# Patient Record
Sex: FEMALE | Race: WHITE | NOT HISPANIC OR LATINO
[De-identification: names, ages, dates, MRNs, and addresses within clinical notes are randomized per-mention and may not be internally consistent; named-entity substitution may affect disease eponyms.]

---

## 2017-11-03 PROBLEM — Z00.00 ENCOUNTER FOR PREVENTIVE HEALTH EXAMINATION: Noted: 2017-11-03

## 2017-12-05 ENCOUNTER — APPOINTMENT (OUTPATIENT)
Dept: ENDOCRINOLOGY | Facility: CLINIC | Age: 37
End: 2017-12-05
Payer: COMMERCIAL

## 2017-12-05 VITALS
HEART RATE: 91 BPM | OXYGEN SATURATION: 97 % | BODY MASS INDEX: 29.19 KG/M2 | HEIGHT: 67 IN | DIASTOLIC BLOOD PRESSURE: 70 MMHG | WEIGHT: 186 LBS | RESPIRATION RATE: 16 BRPM | SYSTOLIC BLOOD PRESSURE: 106 MMHG

## 2017-12-05 DIAGNOSIS — E34.9 ENDOCRINE DISORDER, UNSPECIFIED: ICD-10-CM

## 2017-12-05 DIAGNOSIS — Z80.3 FAMILY HISTORY OF MALIGNANT NEOPLASM OF BREAST: ICD-10-CM

## 2017-12-05 DIAGNOSIS — Z80.41 FAMILY HISTORY OF MALIGNANT NEOPLASM OF OVARY: ICD-10-CM

## 2017-12-05 DIAGNOSIS — R63.5 ABNORMAL WEIGHT GAIN: ICD-10-CM

## 2017-12-05 DIAGNOSIS — Z80.9 FAMILY HISTORY OF MALIGNANT NEOPLASM, UNSPECIFIED: ICD-10-CM

## 2017-12-05 DIAGNOSIS — N91.1 SECONDARY AMENORRHEA: ICD-10-CM

## 2017-12-05 DIAGNOSIS — Z87.42 PERSONAL HISTORY OF OTHER DISEASES OF THE FEMALE GENITAL TRACT: ICD-10-CM

## 2017-12-05 DIAGNOSIS — Z83.3 FAMILY HISTORY OF DIABETES MELLITUS: ICD-10-CM

## 2017-12-05 PROCEDURE — 99205 OFFICE O/P NEW HI 60 MIN: CPT

## 2017-12-05 RX ORDER — METFORMIN HYDROCHLORIDE 500 MG/1
500 TABLET, COATED ORAL
Qty: 30 | Refills: 5 | Status: ACTIVE | COMMUNITY
Start: 2017-12-05 | End: 1900-01-01

## 2017-12-12 LAB
17OHP SERPL-MCNC: 87 NG/DL
ANDROST SERPL-MCNC: 305 NG/DL
DHEA-SULFATE, SERUM: 153 UG/DL
IGF-1 INTERP: NORMAL
IGF-I BLD-MCNC: 199 NG/ML
PROLACTIN SERPL-MCNC: 11.3 NG/ML
TESTOST BND SERPL-MCNC: 4.7 PG/ML
TESTOST SERPL-MCNC: 65.7 NG/DL
TSH SERPL-ACNC: 1.08 UIU/ML

## 2018-07-19 ENCOUNTER — APPOINTMENT (OUTPATIENT)
Dept: SURGERY | Facility: CLINIC | Age: 38
End: 2018-07-19
Payer: COMMERCIAL

## 2018-07-19 VITALS
BODY MASS INDEX: 29.03 KG/M2 | OXYGEN SATURATION: 100 % | TEMPERATURE: 98.2 F | SYSTOLIC BLOOD PRESSURE: 104 MMHG | DIASTOLIC BLOOD PRESSURE: 63 MMHG | HEIGHT: 67 IN | WEIGHT: 185 LBS | HEART RATE: 68 BPM

## 2018-07-19 PROCEDURE — 99203 OFFICE O/P NEW LOW 30 MIN: CPT

## 2018-08-02 PROBLEM — Z00.00 ENCOUNTER FOR PREVENTIVE HEALTH EXAMINATION: Status: ACTIVE | Noted: 2018-08-02

## 2018-08-07 ENCOUNTER — OUTPATIENT (OUTPATIENT)
Dept: OUTPATIENT SERVICES | Facility: HOSPITAL | Age: 38
LOS: 1 days | End: 2018-08-07
Payer: COMMERCIAL

## 2018-08-07 VITALS
OXYGEN SATURATION: 99 % | HEART RATE: 76 BPM | HEIGHT: 66 IN | DIASTOLIC BLOOD PRESSURE: 72 MMHG | RESPIRATION RATE: 18 BRPM | WEIGHT: 182.98 LBS | TEMPERATURE: 98 F | SYSTOLIC BLOOD PRESSURE: 110 MMHG

## 2018-08-07 DIAGNOSIS — Z01.818 ENCOUNTER FOR OTHER PREPROCEDURAL EXAMINATION: ICD-10-CM

## 2018-08-07 DIAGNOSIS — K81.9 CHOLECYSTITIS, UNSPECIFIED: ICD-10-CM

## 2018-08-07 DIAGNOSIS — Z98.890 OTHER SPECIFIED POSTPROCEDURAL STATES: Chronic | ICD-10-CM

## 2018-08-07 LAB
BLD GP AB SCN SERPL QL: SIGNIFICANT CHANGE UP
HCG SERPL-ACNC: <1 MIU/ML — SIGNIFICANT CHANGE UP

## 2018-08-07 PROCEDURE — 84702 CHORIONIC GONADOTROPIN TEST: CPT

## 2018-08-07 PROCEDURE — 86850 RBC ANTIBODY SCREEN: CPT

## 2018-08-07 PROCEDURE — 86900 BLOOD TYPING SEROLOGIC ABO: CPT

## 2018-08-07 PROCEDURE — 86901 BLOOD TYPING SEROLOGIC RH(D): CPT

## 2018-08-07 PROCEDURE — G0463: CPT

## 2018-08-07 RX ORDER — SODIUM CHLORIDE 9 MG/ML
3 INJECTION INTRAMUSCULAR; INTRAVENOUS; SUBCUTANEOUS EVERY 8 HOURS
Qty: 0 | Refills: 0 | Status: DISCONTINUED | OUTPATIENT
Start: 2018-08-10 | End: 2018-08-18

## 2018-08-07 NOTE — H&P PST ADULT - HISTORY OF PRESENT ILLNESS
37 yr old female with PMH of PCOS presents with c/o intermittent right upper quadrant pain due to sludge in gallbladder. Pt for laparoscopic cholecystectomy with intraoperative cholangiogram, possible open on 8/10/2018.

## 2018-08-07 NOTE — H&P PST ADULT - NSANTHOSAYNRD_GEN_A_CORE
No. ZACH screening performed.  STOP BANG Legend: 0-2 = LOW Risk; 3-4 = INTERMEDIATE Risk; 5-8 = HIGH Risk

## 2018-08-07 NOTE — H&P PST ADULT - PMH
Cholelithiasis    PCOS (polycystic ovarian syndrome) Cholecystitis    Cholelithiasis    PCOS (polycystic ovarian syndrome)

## 2018-08-07 NOTE — H&P PST ADULT - RS GEN PE MLT RESP DETAILS PC
no intercostal retractions/no rales/normal/no rhonchi/airway patent/no wheezes/no chest wall tenderness/good air movement/breath sounds equal/no subcutaneous emphysema/respirations non-labored/clear to auscultation bilaterally

## 2018-08-07 NOTE — H&P PST ADULT - ASSESSMENT
37 yr old female with PMH of PCOS presents with cholecystitis. Pt for laparoscopic cholecystectomy with intraoperative cholangiogram, possible open on 8/10/2018.

## 2018-08-07 NOTE — H&P PST ADULT - NEGATIVE CARDIOVASCULAR SYMPTOMS
no paroxysmal nocturnal dyspnea/no peripheral edema/no palpitations/no orthopnea/no dyspnea on exertion/no claudication/no chest pain

## 2018-08-07 NOTE — H&P PST ADULT - FAMILY HISTORY
Father  Still living? Yes, Estimated age: Age Unknown  Family history of diabetes mellitus in father, Age at diagnosis: Age Unknown     Grandparent  Still living? No  Family history of breast cancer, Age at diagnosis: Age Unknown  Family history of skin cancer, Age at diagnosis: Age Unknown

## 2018-08-09 ENCOUNTER — TRANSCRIPTION ENCOUNTER (OUTPATIENT)
Age: 38
End: 2018-08-09

## 2018-08-10 ENCOUNTER — RESULT REVIEW (OUTPATIENT)
Age: 38
End: 2018-08-10

## 2018-08-10 ENCOUNTER — OUTPATIENT (OUTPATIENT)
Dept: OUTPATIENT SERVICES | Facility: HOSPITAL | Age: 38
LOS: 1 days | End: 2018-08-10
Payer: COMMERCIAL

## 2018-08-10 VITALS
HEART RATE: 75 BPM | DIASTOLIC BLOOD PRESSURE: 73 MMHG | RESPIRATION RATE: 16 BRPM | OXYGEN SATURATION: 100 % | SYSTOLIC BLOOD PRESSURE: 111 MMHG | HEIGHT: 66 IN | TEMPERATURE: 98 F | WEIGHT: 182.98 LBS

## 2018-08-10 VITALS
SYSTOLIC BLOOD PRESSURE: 124 MMHG | TEMPERATURE: 98 F | OXYGEN SATURATION: 98 % | DIASTOLIC BLOOD PRESSURE: 73 MMHG | RESPIRATION RATE: 14 BRPM | HEART RATE: 51 BPM

## 2018-08-10 DIAGNOSIS — K81.9 CHOLECYSTITIS, UNSPECIFIED: ICD-10-CM

## 2018-08-10 DIAGNOSIS — Z98.890 OTHER SPECIFIED POSTPROCEDURAL STATES: Chronic | ICD-10-CM

## 2018-08-10 LAB — BLD GP AB SCN SERPL QL: SIGNIFICANT CHANGE UP

## 2018-08-10 PROCEDURE — 47563 LAPARO CHOLECYSTECTOMY/GRAPH: CPT | Mod: AS

## 2018-08-10 PROCEDURE — 47563 LAPARO CHOLECYSTECTOMY/GRAPH: CPT

## 2018-08-10 PROCEDURE — 86850 RBC ANTIBODY SCREEN: CPT

## 2018-08-10 PROCEDURE — 88304 TISSUE EXAM BY PATHOLOGIST: CPT

## 2018-08-10 PROCEDURE — 86901 BLOOD TYPING SEROLOGIC RH(D): CPT

## 2018-08-10 PROCEDURE — 88304 TISSUE EXAM BY PATHOLOGIST: CPT | Mod: 26

## 2018-08-10 PROCEDURE — 76000 FLUOROSCOPY <1 HR PHYS/QHP: CPT

## 2018-08-10 RX ORDER — ACETAMINOPHEN WITH CODEINE 300MG-30MG
1 TABLET ORAL EVERY 4 HOURS
Qty: 0 | Refills: 0 | Status: DISCONTINUED | OUTPATIENT
Start: 2018-08-10 | End: 2018-08-10

## 2018-08-10 RX ORDER — SODIUM CHLORIDE 9 MG/ML
1000 INJECTION, SOLUTION INTRAVENOUS
Qty: 0 | Refills: 0 | Status: DISCONTINUED | OUTPATIENT
Start: 2018-08-10 | End: 2018-08-10

## 2018-08-10 RX ORDER — ACETAMINOPHEN WITH CODEINE 300MG-30MG
1 TABLET ORAL
Qty: 6 | Refills: 0 | OUTPATIENT
Start: 2018-08-10

## 2018-08-10 RX ORDER — ACETAMINOPHEN 500 MG
2 TABLET ORAL
Qty: 0 | Refills: 0 | COMMUNITY

## 2018-08-10 RX ORDER — HYDROMORPHONE HYDROCHLORIDE 2 MG/ML
0.5 INJECTION INTRAMUSCULAR; INTRAVENOUS; SUBCUTANEOUS
Qty: 0 | Refills: 0 | Status: DISCONTINUED | OUTPATIENT
Start: 2018-08-10 | End: 2018-08-10

## 2018-08-10 RX ORDER — ACETAMINOPHEN 500 MG
1000 TABLET ORAL ONCE
Qty: 0 | Refills: 0 | Status: COMPLETED | OUTPATIENT
Start: 2018-08-10 | End: 2018-08-10

## 2018-08-10 RX ORDER — ONDANSETRON 8 MG/1
4 TABLET, FILM COATED ORAL ONCE
Qty: 0 | Refills: 0 | Status: DISCONTINUED | OUTPATIENT
Start: 2018-08-10 | End: 2018-08-10

## 2018-08-10 RX ADMIN — Medication 400 MILLIGRAM(S): at 10:00

## 2018-08-10 RX ADMIN — HYDROMORPHONE HYDROCHLORIDE 0.5 MILLIGRAM(S): 2 INJECTION INTRAMUSCULAR; INTRAVENOUS; SUBCUTANEOUS at 10:15

## 2018-08-10 RX ADMIN — SODIUM CHLORIDE 3 MILLILITER(S): 9 INJECTION INTRAMUSCULAR; INTRAVENOUS; SUBCUTANEOUS at 07:40

## 2018-08-10 RX ADMIN — Medication 1000 MILLIGRAM(S): at 10:15

## 2018-08-16 LAB — SURGICAL PATHOLOGY FINAL REPORT - CH: SIGNIFICANT CHANGE UP

## 2018-08-20 PROBLEM — K80.20 CALCULUS OF GALLBLADDER WITHOUT CHOLECYSTITIS WITHOUT OBSTRUCTION: Chronic | Status: ACTIVE | Noted: 2018-08-07

## 2018-08-20 PROBLEM — E28.2 POLYCYSTIC OVARIAN SYNDROME: Chronic | Status: ACTIVE | Noted: 2018-08-07

## 2018-08-20 PROBLEM — K81.9 CHOLECYSTITIS, UNSPECIFIED: Chronic | Status: ACTIVE | Noted: 2018-08-07

## 2018-08-27 ENCOUNTER — APPOINTMENT (OUTPATIENT)
Dept: SURGERY | Facility: CLINIC | Age: 38
End: 2018-08-27
Payer: COMMERCIAL

## 2018-08-27 PROCEDURE — 99024 POSTOP FOLLOW-UP VISIT: CPT

## 2019-04-01 PROBLEM — N91.1 SECONDARY AMENORRHEA: Status: ACTIVE | Noted: 2017-12-05

## 2020-07-29 ENCOUNTER — RESULT REVIEW (OUTPATIENT)
Age: 40
End: 2020-07-29

## 2023-06-21 ENCOUNTER — EMERGENCY (EMERGENCY)
Facility: HOSPITAL | Age: 43
LOS: 1 days | Discharge: HOME | End: 2023-06-21
Attending: EMERGENCY MEDICINE | Admitting: EMERGENCY MEDICINE
Payer: COMMERCIAL

## 2023-06-21 VITALS
RESPIRATION RATE: 15 BRPM | SYSTOLIC BLOOD PRESSURE: 118 MMHG | DIASTOLIC BLOOD PRESSURE: 68 MMHG | TEMPERATURE: 98 F | OXYGEN SATURATION: 100 % | HEART RATE: 77 BPM

## 2023-06-21 VITALS
HEART RATE: 79 BPM | RESPIRATION RATE: 18 BRPM | SYSTOLIC BLOOD PRESSURE: 140 MMHG | DIASTOLIC BLOOD PRESSURE: 73 MMHG | TEMPERATURE: 98 F | OXYGEN SATURATION: 98 %

## 2023-06-21 DIAGNOSIS — Z98.890 OTHER SPECIFIED POSTPROCEDURAL STATES: Chronic | ICD-10-CM

## 2023-06-21 LAB
ALBUMIN SERPL ELPH-MCNC: 4.7 G/DL — SIGNIFICANT CHANGE UP (ref 3.3–5)
ALP SERPL-CCNC: 76 U/L — SIGNIFICANT CHANGE UP (ref 40–120)
ALT FLD-CCNC: 32 U/L — SIGNIFICANT CHANGE UP (ref 4–33)
ANION GAP SERPL CALC-SCNC: 13 MMOL/L — SIGNIFICANT CHANGE UP (ref 7–14)
APPEARANCE UR: ABNORMAL
AST SERPL-CCNC: 21 U/L — SIGNIFICANT CHANGE UP (ref 4–32)
B PERT DNA SPEC QL NAA+PROBE: SIGNIFICANT CHANGE UP
B PERT+PARAPERT DNA PNL SPEC NAA+PROBE: SIGNIFICANT CHANGE UP
BACTERIA # UR AUTO: NEGATIVE — SIGNIFICANT CHANGE UP
BASOPHILS # BLD AUTO: 0.04 K/UL — SIGNIFICANT CHANGE UP (ref 0–0.2)
BASOPHILS NFR BLD AUTO: 0.4 % — SIGNIFICANT CHANGE UP (ref 0–2)
BILIRUB SERPL-MCNC: 1.2 MG/DL — SIGNIFICANT CHANGE UP (ref 0.2–1.2)
BILIRUB UR-MCNC: NEGATIVE — SIGNIFICANT CHANGE UP
BORDETELLA PARAPERTUSSIS (RAPRVP): SIGNIFICANT CHANGE UP
BUN SERPL-MCNC: 8 MG/DL — SIGNIFICANT CHANGE UP (ref 7–23)
C PNEUM DNA SPEC QL NAA+PROBE: SIGNIFICANT CHANGE UP
CALCIUM SERPL-MCNC: 9.7 MG/DL — SIGNIFICANT CHANGE UP (ref 8.4–10.5)
CHLORIDE SERPL-SCNC: 104 MMOL/L — SIGNIFICANT CHANGE UP (ref 98–107)
CO2 SERPL-SCNC: 21 MMOL/L — LOW (ref 22–31)
COLOR SPEC: YELLOW — SIGNIFICANT CHANGE UP
CREAT SERPL-MCNC: 0.62 MG/DL — SIGNIFICANT CHANGE UP (ref 0.5–1.3)
DIFF PNL FLD: ABNORMAL
EGFR: 114 ML/MIN/1.73M2 — SIGNIFICANT CHANGE UP
EOSINOPHIL # BLD AUTO: 0.02 K/UL — SIGNIFICANT CHANGE UP (ref 0–0.5)
EOSINOPHIL NFR BLD AUTO: 0.2 % — SIGNIFICANT CHANGE UP (ref 0–6)
EPI CELLS # UR: 6 /HPF — HIGH (ref 0–5)
FLUAV SUBTYP SPEC NAA+PROBE: SIGNIFICANT CHANGE UP
FLUBV RNA SPEC QL NAA+PROBE: SIGNIFICANT CHANGE UP
GLUCOSE SERPL-MCNC: 118 MG/DL — HIGH (ref 70–99)
GLUCOSE UR QL: NEGATIVE — SIGNIFICANT CHANGE UP
HADV DNA SPEC QL NAA+PROBE: SIGNIFICANT CHANGE UP
HCG SERPL-ACNC: <1 MIU/ML — SIGNIFICANT CHANGE UP
HCOV 229E RNA SPEC QL NAA+PROBE: SIGNIFICANT CHANGE UP
HCOV HKU1 RNA SPEC QL NAA+PROBE: SIGNIFICANT CHANGE UP
HCOV NL63 RNA SPEC QL NAA+PROBE: SIGNIFICANT CHANGE UP
HCOV OC43 RNA SPEC QL NAA+PROBE: SIGNIFICANT CHANGE UP
HCT VFR BLD CALC: 41.1 % — SIGNIFICANT CHANGE UP (ref 34.5–45)
HGB BLD-MCNC: 13.9 G/DL — SIGNIFICANT CHANGE UP (ref 11.5–15.5)
HMPV RNA SPEC QL NAA+PROBE: SIGNIFICANT CHANGE UP
HPIV1 RNA SPEC QL NAA+PROBE: SIGNIFICANT CHANGE UP
HPIV2 RNA SPEC QL NAA+PROBE: SIGNIFICANT CHANGE UP
HPIV3 RNA SPEC QL NAA+PROBE: SIGNIFICANT CHANGE UP
HPIV4 RNA SPEC QL NAA+PROBE: SIGNIFICANT CHANGE UP
HYALINE CASTS # UR AUTO: 0 /LPF — SIGNIFICANT CHANGE UP (ref 0–7)
IANC: 5.67 K/UL — SIGNIFICANT CHANGE UP (ref 1.8–7.4)
IMM GRANULOCYTES NFR BLD AUTO: 0.3 % — SIGNIFICANT CHANGE UP (ref 0–0.9)
KETONES UR-MCNC: ABNORMAL
LEUKOCYTE ESTERASE UR-ACNC: NEGATIVE — SIGNIFICANT CHANGE UP
LIDOCAIN IGE QN: 33 U/L — SIGNIFICANT CHANGE UP (ref 7–60)
LYMPHOCYTES # BLD AUTO: 2.74 K/UL — SIGNIFICANT CHANGE UP (ref 1–3.3)
LYMPHOCYTES # BLD AUTO: 30.2 % — SIGNIFICANT CHANGE UP (ref 13–44)
M PNEUMO DNA SPEC QL NAA+PROBE: SIGNIFICANT CHANGE UP
MAGNESIUM SERPL-MCNC: 2 MG/DL — SIGNIFICANT CHANGE UP (ref 1.6–2.6)
MCHC RBC-ENTMCNC: 29.4 PG — SIGNIFICANT CHANGE UP (ref 27–34)
MCHC RBC-ENTMCNC: 33.8 GM/DL — SIGNIFICANT CHANGE UP (ref 32–36)
MCV RBC AUTO: 87.1 FL — SIGNIFICANT CHANGE UP (ref 80–100)
MONOCYTES # BLD AUTO: 0.56 K/UL — SIGNIFICANT CHANGE UP (ref 0–0.9)
MONOCYTES NFR BLD AUTO: 6.2 % — SIGNIFICANT CHANGE UP (ref 2–14)
NEUTROPHILS # BLD AUTO: 5.67 K/UL — SIGNIFICANT CHANGE UP (ref 1.8–7.4)
NEUTROPHILS NFR BLD AUTO: 62.7 % — SIGNIFICANT CHANGE UP (ref 43–77)
NITRITE UR-MCNC: NEGATIVE — SIGNIFICANT CHANGE UP
NRBC # BLD: 0 /100 WBCS — SIGNIFICANT CHANGE UP (ref 0–0)
NRBC # FLD: 0 K/UL — SIGNIFICANT CHANGE UP (ref 0–0)
PH UR: 6 — SIGNIFICANT CHANGE UP (ref 5–8)
PHOSPHATE SERPL-MCNC: 3.2 MG/DL — SIGNIFICANT CHANGE UP (ref 2.5–4.5)
PLATELET # BLD AUTO: 235 K/UL — SIGNIFICANT CHANGE UP (ref 150–400)
POTASSIUM SERPL-MCNC: 4 MMOL/L — SIGNIFICANT CHANGE UP (ref 3.5–5.3)
POTASSIUM SERPL-SCNC: 4 MMOL/L — SIGNIFICANT CHANGE UP (ref 3.5–5.3)
PROT SERPL-MCNC: 7.6 G/DL — SIGNIFICANT CHANGE UP (ref 6–8.3)
PROT UR-MCNC: ABNORMAL
RAPID RVP RESULT: SIGNIFICANT CHANGE UP
RBC # BLD: 4.72 M/UL — SIGNIFICANT CHANGE UP (ref 3.8–5.2)
RBC # FLD: 12.7 % — SIGNIFICANT CHANGE UP (ref 10.3–14.5)
RBC CASTS # UR COMP ASSIST: 4 /HPF — SIGNIFICANT CHANGE UP (ref 0–4)
RSV RNA SPEC QL NAA+PROBE: SIGNIFICANT CHANGE UP
RV+EV RNA SPEC QL NAA+PROBE: SIGNIFICANT CHANGE UP
SARS-COV-2 RNA SPEC QL NAA+PROBE: SIGNIFICANT CHANGE UP
SODIUM SERPL-SCNC: 138 MMOL/L — SIGNIFICANT CHANGE UP (ref 135–145)
SP GR SPEC: 1.03 — SIGNIFICANT CHANGE UP (ref 1.01–1.05)
TSH SERPL-MCNC: 2.8 UIU/ML — SIGNIFICANT CHANGE UP (ref 0.27–4.2)
UROBILINOGEN FLD QL: SIGNIFICANT CHANGE UP
WBC # BLD: 9.06 K/UL — SIGNIFICANT CHANGE UP (ref 3.8–10.5)
WBC # FLD AUTO: 9.06 K/UL — SIGNIFICANT CHANGE UP (ref 3.8–10.5)
WBC UR QL: 2 /HPF — SIGNIFICANT CHANGE UP (ref 0–5)

## 2023-06-21 PROCEDURE — 71045 X-RAY EXAM CHEST 1 VIEW: CPT | Mod: 26

## 2023-06-21 PROCEDURE — 99285 EMERGENCY DEPT VISIT HI MDM: CPT

## 2023-06-21 PROCEDURE — G1004: CPT

## 2023-06-21 PROCEDURE — 70450 CT HEAD/BRAIN W/O DYE: CPT | Mod: 26,MG

## 2023-06-21 PROCEDURE — 93010 ELECTROCARDIOGRAM REPORT: CPT

## 2023-06-21 RX ORDER — SODIUM CHLORIDE 9 MG/ML
1000 INJECTION INTRAMUSCULAR; INTRAVENOUS; SUBCUTANEOUS ONCE
Refills: 0 | Status: COMPLETED | OUTPATIENT
Start: 2023-06-21 | End: 2023-06-21

## 2023-06-21 RX ADMIN — SODIUM CHLORIDE 1000 MILLILITER(S): 9 INJECTION INTRAMUSCULAR; INTRAVENOUS; SUBCUTANEOUS at 07:20

## 2023-06-21 NOTE — ED ADULT NURSE NOTE - NSFALLHARMRISKINTERV_ED_ALL_ED

## 2023-06-21 NOTE — ED PROVIDER NOTE - NSFOLLOWUPINSTRUCTIONS_ED_ALL_ED_FT
[facility]  Cardiology  [address] [place] [address]  Phone: [phone]  Fax:   Follow Up Time:     [facility]  Cardiology  [address], [address]  [place] [address]  Phone: [phone]  Fax:   Follow Up Time:     [facility]  Cardiology  [address] [place] [address]  Phone: [phone]  Fax:   Follow Up Time:     Near Syncope    WHAT YOU NEED TO KNOW:    Near syncope, also called presyncope, is the feeling that you may faint (lose consciousness), but you do not. You can control some health conditions that cause near syncope. Your healthcare providers can help you create a plan to manage near syncope and prevent episodes.    DISCHARGE INSTRUCTIONS:    Seek care immediately if:    You have sudden chest pain.    You have trouble breathing or shortness of breath.    You have vision changes, are sweating, and have nausea while you are sitting or lying down.    You feel dizzy or flushed and your heart is fluttering.    You lose consciousness.    You cannot use your arm, hand, foot, or leg, or it feels weak.    You have trouble speaking or understanding others when they speak.  Contact your healthcare provider if:    You have new or worsening symptoms.    Your heart beats faster or slower than usual.    You have questions or concerns about your condition or care.  Medicines:    Medicines may be needed to help your heart pump strongly and regularly. Your healthcare provider may also make changes to any medicines that are causing syncope.    Take your medicine as directed. Contact your healthcare provider if you think your medicine is not helping or if you have side effects. Tell him or her if you are allergic to any medicine. Keep a list of the medicines, vitamins, and herbs you take. Include the amounts, and when and why you take them. Bring the list or the pill bottles to follow-up visits. Carry your medicine list with you in case of an emergency.  Follow up with your healthcare provider as directed: You may need more tests to help find the cause of your near syncope episodes. The tests will help healthcare providers plan the best treatment for you. Write down your questions so you remember to ask them during your visits.    Manage near syncope:    Sit or lie down when needed. This includes when you feel dizzy, your throat is getting tight, and your vision changes. Raise your legs above the level of your heart.    Take slow, deep breaths if you start to breathe faster with anxiety or fear. This can help decrease dizziness and the feeling that you might faint.    Keep a record of your near syncope episodes. Include your symptoms and your activity before and after the episode. The record can help your healthcare provider find the cause of your near syncope and help you manage episodes.  Prevent a near syncope episode:    Move slowly and let yourself get used to one position before you move to another position. This is very important when you change from a lying or sitting position to a standing position. Take some deep breaths before you stand up from a lying position. Stand up slowly. Sudden movements may cause a fainting spell. Sit on the side of the bed or couch for a few minutes before you stand up. If you are on bedrest, try to be upright for about 2 hours each day, or as directed. Do not lock your legs if you are standing for a long period of time. Move your legs and bend your knees to keep blood flowing.    Follow your healthcare provider's recommendations. Your provider may recommend that you drink more liquids to prevent dehydration. You may also need to have more salt to keep your blood pressure from dropping too low and causing syncope. Your provider will tell you how much liquid and sodium to have each day. He or she will also tell you how much physical activity is safe for you. This will depend on what is causing your near syncope.    Watch for signs of low blood sugar. These include hunger, nervousness, sweating, and fast or fluttery heartbeats. Talk with your healthcare provider about ways to keep your blood sugar level steady.    Check your blood pressure often. This is important if you take medicine to lower your blood pressure. Check your blood pressure when you are lying down and when you are standing. Ask how often to check during the day. Keep a record of your blood pressure numbers. Your healthcare provider may use the record to help plan your treatment.  How to take a Blood Pressure      Do not strain if you are constipated. You may faint if you strain to have a bowel movement. Walking is the best way to get your bowels moving. Eat foods high in fiber to make it easier to have a bowel movement. Good examples are high-fiber cereals, beans, vegetables, and whole-grain breads. Prune juice may help make bowel movements softer.

## 2023-06-21 NOTE — ED PROVIDER NOTE - OBJECTIVE STATEMENT
42-year-old female history of PCOS, no other medical history presenting with chief complaint of near syncope.  Patient states that she was sitting on her couch watching TV at 8 PM last night, when she felt  like she was going to pass out, endorsing darkening of vision and so she presented to near [facility].  Patient had negative work-up including EKG as well as labs and chest x-ray.  She was discharged overnight, however patient felt recurrence of presyncope so presented here to the ED.  She is denying any chest pain, shortness of breath, dark stools, nausea, vomiting, fevers or chills.  Patient is denying any family history of cardiac death at an early age.  States that she used to take ozempic for weight loss but denies use currently. Normal .  denies any  numbness, tingling, leg or arm weakness.    NKDA

## 2023-06-21 NOTE — ED PROVIDER NOTE - PHYSICAL EXAMINATION
Gen: non-toxic appearing  Head: NCAT  Eyes: EOMI, PERRLA, no conjunctival pallor, no scleral icterus  ENT: mucous membranes moist, no discharge  Neck: neck supple  Resp: CTAB, no W/R/R  CV: RRR, +S1/S2, no M/R/G  GI: Abdomen soft non-distended, NTTP, no masses  MSK: No open wounds, no bruising, no lower extremity edema  Neuro: A&Ox4, sensation nl, motor 5/5 RUE/LUE/RLE/LLE, follows commands  Ext: no edema, no deformity, warm and well-perfused. minor shaking  Skin: skin warm and moist, some pallor

## 2023-06-21 NOTE — ED ADULT TRIAGE NOTE - CHIEF COMPLAINT QUOTE
DC earlier from [facility] for same complaint (negative work up)- p/w generalized weakness, pallor, and pre-syncope- no pmhx,

## 2023-06-21 NOTE — ED PROVIDER NOTE - CLINICAL SUMMARY MEDICAL DECISION MAKING FREE TEXT BOX
[facility] BLACK PGY-3: 42-year-old female history of PCOS, no other medical history presenting with chief complaint of near syncope.   Patient with recent negative work-up at outside hospital.   Will obtain  repeat cardiac work-up to evaluate for ACS, chest x-ray.  Patient also endorsing some increased urination, will obtain urinalysis to evaluate for possible UTI.  Fingerstick here, and afebrile in the ED including rectal temp.  Will obtain RVP to evaluate for possible  flu given minor shaking. We will obtain labs to evaluate for electrolyte abnormality/metabolic imbalance.  Will place patient on cardiac monitor to closely reassess.  Will obtain CBC to evaluate for possible anemia given allergy and observed skin pallor.  No  severe tearing chest pain, pulse deficits or numbness/tingling to suggest more concerning diagnoses such as aortic dissection.  disposition pending results and patient course, can consider CDU for echo/stress test if patient with persistent symptoms.

## 2023-06-21 NOTE — ED PROVIDER NOTE - NSICDXFAMILYHX_GEN_ALL_CORE_FT
FAMILY HISTORY:  Father  Still living? Yes, Estimated age: Age Unknown  Family history of diabetes mellitus in father, Age at diagnosis: Age Unknown    Grandparent  Still living? No  Family history of breast cancer, Age at diagnosis: Age Unknown  Family history of skin cancer, Age at diagnosis: Age Unknown

## 2023-06-21 NOTE — ED PROVIDER NOTE - PATIENT PORTAL LINK FT
You can access the FollowMyHealth Patient Portal offered by [facility] by registering at the following website: [email or web address]. By joining [employer]’s FollowMyHealth portal, you will also be able to view your health information using other applications (apps) compatible with our system. You can access the FollowMyHealth Patient Portal offered by [facility] by registering at the following website: [email or web address]. By joining [employer]’s FollowMyHealth portal, you will also be able to view your health information using other applications (apps) compatible with our system.

## 2023-06-21 NOTE — ED ADULT NURSE NOTE - OBJECTIVE STATEMENT
Pt arrives to room 2 A&Ox4, ambulatory, on room air coming to ED for dizziness. Pt with no pertinent past medical history. Pt endorses at 8PM last night, while sitting on the couch, she encountered an episode of dizziness which prompted her to call EMS. Pt seen at [facility] with negative findings and discharged home. Pt arrives to [facility] for further check up. Pt appears pallor, endorses episodes of dizziness are still occurring. Denies recent falls, drug/alcohol use, chest pain, SOB, vision changes, dark stool. Respirations even and unlabored, chest rise and fall equal b/l. NSR on cardiac monitor. Pt endorses recent use of Ozempic for weight loss in March, denies current use. Fsx 109. Abdomen soft and nontender. Pt endorses urinary frequency, denies hematuria, dysuria. MD Byrd at bedside for assessment. Safety maintained.

## 2023-06-21 NOTE — ED PROVIDER NOTE - PROGRESS NOTE DETAILS
PT placed on NC at 2LPM due to 89% O2 sat while sleeping     Cooley Demarcus, RN  11/11/22 3196 Kirill Hernandez, PGY4: All labs reviewed and are nonactionable.  Chest x-ray clear without any infiltrates.  Patient feeling slightly better while in the ER.  Ambulatory with steady gait.  No clinical indications for admission at this time.  Discussed return precautions and all questions answered. Pt in agreement w/ plan. CAOx3, NAD, VSS. Stable for d/c. Kirill Hernandez, PGY4: Patient hesitant to leave, states "I still don't feel well". Spoke w/ patient and her  multiple times regarding blood work results. Patient requesting CT head. Advised of low suspicion for intracranial findings, lack of medical necessity, and the radiation exposure- patient still requesting imaging and reversal of discharge. Will obtain CT at patient's request and reassess. Kirill Hernandez, PGY4: CT without acute findings. Discussed results w/ patient. She is now agreeable to discharge. Will print new discharge with results.  to  patient.

## 2023-06-21 NOTE — ED ADULT NURSE NOTE - CHIEF COMPLAINT QUOTE
DC earlier from [facility] for same complaint (negative work up)- p/w generalized weakness, pallor, and pre-syncope- no pmhx,

## 2023-06-22 ENCOUNTER — EMERGENCY (EMERGENCY)
Facility: HOSPITAL | Age: 43
LOS: 1 days | Discharge: HOME | End: 2023-06-22
Attending: STUDENT IN AN ORGANIZED HEALTH CARE EDUCATION/TRAINING PROGRAM | Admitting: STUDENT IN AN ORGANIZED HEALTH CARE EDUCATION/TRAINING PROGRAM
Payer: COMMERCIAL

## 2023-06-22 VITALS
DIASTOLIC BLOOD PRESSURE: 77 MMHG | RESPIRATION RATE: 17 BRPM | TEMPERATURE: 98 F | HEART RATE: 68 BPM | SYSTOLIC BLOOD PRESSURE: 123 MMHG | OXYGEN SATURATION: 100 %

## 2023-06-22 DIAGNOSIS — E23.0 HYPOPITUITARISM: ICD-10-CM

## 2023-06-22 DIAGNOSIS — Z98.890 OTHER SPECIFIED POSTPROCEDURAL STATES: Chronic | ICD-10-CM

## 2023-06-22 DIAGNOSIS — Z87.42 PERSONAL HISTORY OF OTHER DISEASES OF THE FEMALE GENITAL TRACT: ICD-10-CM

## 2023-06-22 DIAGNOSIS — R42 DIZZINESS AND GIDDINESS: ICD-10-CM

## 2023-06-22 LAB
ALBUMIN SERPL ELPH-MCNC: 4.5 G/DL — SIGNIFICANT CHANGE UP (ref 3.3–5)
ALP SERPL-CCNC: 67 U/L — SIGNIFICANT CHANGE UP (ref 40–120)
ALT FLD-CCNC: 31 U/L — SIGNIFICANT CHANGE UP (ref 4–33)
ANION GAP SERPL CALC-SCNC: 14 MMOL/L — SIGNIFICANT CHANGE UP (ref 7–14)
APPEARANCE UR: CLEAR — SIGNIFICANT CHANGE UP
AST SERPL-CCNC: 35 U/L — HIGH (ref 4–32)
B PERT DNA SPEC QL NAA+PROBE: SIGNIFICANT CHANGE UP
B PERT+PARAPERT DNA PNL SPEC NAA+PROBE: SIGNIFICANT CHANGE UP
BASOPHILS # BLD AUTO: 0.03 K/UL — SIGNIFICANT CHANGE UP (ref 0–0.2)
BASOPHILS NFR BLD AUTO: 0.5 % — SIGNIFICANT CHANGE UP (ref 0–2)
BILIRUB SERPL-MCNC: 1.4 MG/DL — HIGH (ref 0.2–1.2)
BILIRUB UR-MCNC: NEGATIVE — SIGNIFICANT CHANGE UP
BLOOD GAS VENOUS COMPREHENSIVE RESULT: SIGNIFICANT CHANGE UP
BORDETELLA PARAPERTUSSIS (RAPRVP): SIGNIFICANT CHANGE UP
BUN SERPL-MCNC: 7 MG/DL — SIGNIFICANT CHANGE UP (ref 7–23)
C PNEUM DNA SPEC QL NAA+PROBE: SIGNIFICANT CHANGE UP
CALCIUM SERPL-MCNC: 9.3 MG/DL — SIGNIFICANT CHANGE UP (ref 8.4–10.5)
CHLORIDE SERPL-SCNC: 103 MMOL/L — SIGNIFICANT CHANGE UP (ref 98–107)
CO2 SERPL-SCNC: 20 MMOL/L — LOW (ref 22–31)
COLOR SPEC: SIGNIFICANT CHANGE UP
CREAT SERPL-MCNC: 0.63 MG/DL — SIGNIFICANT CHANGE UP (ref 0.5–1.3)
DIFF PNL FLD: NEGATIVE — SIGNIFICANT CHANGE UP
EGFR: 114 ML/MIN/1.73M2 — SIGNIFICANT CHANGE UP
EOSINOPHIL # BLD AUTO: 0.08 K/UL — SIGNIFICANT CHANGE UP (ref 0–0.5)
EOSINOPHIL NFR BLD AUTO: 1.3 % — SIGNIFICANT CHANGE UP (ref 0–6)
FLUAV SUBTYP SPEC NAA+PROBE: SIGNIFICANT CHANGE UP
FLUBV RNA SPEC QL NAA+PROBE: SIGNIFICANT CHANGE UP
GLUCOSE SERPL-MCNC: 98 MG/DL — SIGNIFICANT CHANGE UP (ref 70–99)
GLUCOSE UR QL: NEGATIVE — SIGNIFICANT CHANGE UP
HADV DNA SPEC QL NAA+PROBE: SIGNIFICANT CHANGE UP
HCG SERPL-ACNC: <1 MIU/ML — SIGNIFICANT CHANGE UP
HCOV 229E RNA SPEC QL NAA+PROBE: SIGNIFICANT CHANGE UP
HCOV HKU1 RNA SPEC QL NAA+PROBE: SIGNIFICANT CHANGE UP
HCOV NL63 RNA SPEC QL NAA+PROBE: SIGNIFICANT CHANGE UP
HCOV OC43 RNA SPEC QL NAA+PROBE: SIGNIFICANT CHANGE UP
HCT VFR BLD CALC: 46.2 % — HIGH (ref 34.5–45)
HGB BLD-MCNC: 14.8 G/DL — SIGNIFICANT CHANGE UP (ref 11.5–15.5)
HMPV RNA SPEC QL NAA+PROBE: SIGNIFICANT CHANGE UP
HPIV1 RNA SPEC QL NAA+PROBE: SIGNIFICANT CHANGE UP
HPIV2 RNA SPEC QL NAA+PROBE: SIGNIFICANT CHANGE UP
HPIV3 RNA SPEC QL NAA+PROBE: SIGNIFICANT CHANGE UP
HPIV4 RNA SPEC QL NAA+PROBE: SIGNIFICANT CHANGE UP
IANC: 3.63 K/UL — SIGNIFICANT CHANGE UP (ref 1.8–7.4)
IMM GRANULOCYTES NFR BLD AUTO: 0.2 % — SIGNIFICANT CHANGE UP (ref 0–0.9)
KETONES UR-MCNC: NEGATIVE — SIGNIFICANT CHANGE UP
LEUKOCYTE ESTERASE UR-ACNC: NEGATIVE — SIGNIFICANT CHANGE UP
LYMPHOCYTES # BLD AUTO: 2.22 K/UL — SIGNIFICANT CHANGE UP (ref 1–3.3)
LYMPHOCYTES # BLD AUTO: 35.5 % — SIGNIFICANT CHANGE UP (ref 13–44)
M PNEUMO DNA SPEC QL NAA+PROBE: SIGNIFICANT CHANGE UP
MAGNESIUM SERPL-MCNC: 2.1 MG/DL — SIGNIFICANT CHANGE UP (ref 1.6–2.6)
MCHC RBC-ENTMCNC: 29.2 PG — SIGNIFICANT CHANGE UP (ref 27–34)
MCHC RBC-ENTMCNC: 32 GM/DL — SIGNIFICANT CHANGE UP (ref 32–36)
MCV RBC AUTO: 91.3 FL — SIGNIFICANT CHANGE UP (ref 80–100)
MONOCYTES # BLD AUTO: 0.29 K/UL — SIGNIFICANT CHANGE UP (ref 0–0.9)
MONOCYTES NFR BLD AUTO: 4.6 % — SIGNIFICANT CHANGE UP (ref 2–14)
NEUTROPHILS # BLD AUTO: 3.63 K/UL — SIGNIFICANT CHANGE UP (ref 1.8–7.4)
NEUTROPHILS NFR BLD AUTO: 57.9 % — SIGNIFICANT CHANGE UP (ref 43–77)
NITRITE UR-MCNC: NEGATIVE — SIGNIFICANT CHANGE UP
NRBC # BLD: 0 /100 WBCS — SIGNIFICANT CHANGE UP (ref 0–0)
NRBC # FLD: 0 K/UL — SIGNIFICANT CHANGE UP (ref 0–0)
PH UR: 7 — SIGNIFICANT CHANGE UP (ref 5–8)
PHOSPHATE SERPL-MCNC: 2.6 MG/DL — SIGNIFICANT CHANGE UP (ref 2.5–4.5)
PLATELET # BLD AUTO: 218 K/UL — SIGNIFICANT CHANGE UP (ref 150–400)
POTASSIUM SERPL-MCNC: 5.2 MMOL/L — SIGNIFICANT CHANGE UP (ref 3.5–5.3)
POTASSIUM SERPL-SCNC: 5.2 MMOL/L — SIGNIFICANT CHANGE UP (ref 3.5–5.3)
PROT SERPL-MCNC: 7.2 G/DL — SIGNIFICANT CHANGE UP (ref 6–8.3)
PROT UR-MCNC: NEGATIVE — SIGNIFICANT CHANGE UP
RAPID RVP RESULT: SIGNIFICANT CHANGE UP
RBC # BLD: 5.06 M/UL — SIGNIFICANT CHANGE UP (ref 3.8–5.2)
RBC # FLD: 13 % — SIGNIFICANT CHANGE UP (ref 10.3–14.5)
RSV RNA SPEC QL NAA+PROBE: SIGNIFICANT CHANGE UP
RV+EV RNA SPEC QL NAA+PROBE: SIGNIFICANT CHANGE UP
SARS-COV-2 RNA SPEC QL NAA+PROBE: SIGNIFICANT CHANGE UP
SODIUM SERPL-SCNC: 137 MMOL/L — SIGNIFICANT CHANGE UP (ref 135–145)
SP GR SPEC: 1.01 — LOW (ref 1.01–1.05)
T3FREE SERPL-MCNC: 3 PG/ML — SIGNIFICANT CHANGE UP (ref 2–4.4)
T4 AB SER-ACNC: 8.08 UG/DL — SIGNIFICANT CHANGE UP (ref 5.1–13)
TSH SERPL-MCNC: 0.76 UIU/ML — SIGNIFICANT CHANGE UP (ref 0.27–4.2)
UROBILINOGEN FLD QL: SIGNIFICANT CHANGE UP
VIT B12 SERPL-MCNC: 663 PG/ML — SIGNIFICANT CHANGE UP (ref 200–900)
WBC # BLD: 6.26 K/UL — SIGNIFICANT CHANGE UP (ref 3.8–10.5)
WBC # FLD AUTO: 6.26 K/UL — SIGNIFICANT CHANGE UP (ref 3.8–10.5)

## 2023-06-22 PROCEDURE — 99284 EMERGENCY DEPT VISIT MOD MDM: CPT

## 2023-06-22 PROCEDURE — G1004: CPT

## 2023-06-22 PROCEDURE — 70553 MRI BRAIN STEM W/O & W/DYE: CPT | Mod: 26,MG,76

## 2023-06-22 PROCEDURE — 99223 1ST HOSP IP/OBS HIGH 75: CPT

## 2023-06-22 PROCEDURE — 93010 ELECTROCARDIOGRAM REPORT: CPT

## 2023-06-22 PROCEDURE — 71046 X-RAY EXAM CHEST 2 VIEWS: CPT | Mod: 26

## 2023-06-22 RX ORDER — SODIUM CHLORIDE 9 MG/ML
1000 INJECTION INTRAMUSCULAR; INTRAVENOUS; SUBCUTANEOUS ONCE
Refills: 0 | Status: COMPLETED | OUTPATIENT
Start: 2023-06-22 | End: 2023-06-22

## 2023-06-22 RX ADMIN — SODIUM CHLORIDE 1000 MILLILITER(S): 9 INJECTION INTRAMUSCULAR; INTRAVENOUS; SUBCUTANEOUS at 11:07

## 2023-06-22 NOTE — ED PROVIDER NOTE - CLINICAL SUMMARY MEDICAL DECISION MAKING FREE TEXT BOX
Patient with constellation of symptoms including near syncope, blurry vision, generalized weakness, numbness, mild headache, focal sensory changes over the face as well as several months of loose stools and hair loss.  Patient without focal neurodeficit but does appear fatigued.  Was in ED yesterday lab work at that time without significant abnormality CT brain without significant abnormality.  Patient with persistence of symptoms returning to ED today denies any vision changes at this time.  Doubt CVA as etiology of symptoms.  Concern for endocrinopathy.  TSH normal yesterday, will repeat additional TFTs as well as other labs.  Primary care provider concerned about patient as well as ability to expedite outpatient work-up.  Plan labs, Endo consult, will place in CDU for MRI.  Plan discussed with patient and  at bedside.

## 2023-06-22 NOTE — ED ADULT TRIAGE NOTE - CHIEF COMPLAINT QUOTE
Pt c/o of diaphoresis, dizziness and low sugar. States her symptoms started on Tuesday and was seen here yesterday. C/o of dizziness. No neuro deficits noted on assessment. Finger stick 98. Denies chest pain, headache and SOB.

## 2023-06-22 NOTE — ED PROVIDER NOTE - PHYSICAL EXAMINATION
GEN: no acute respiratory distress. nontoxic, speaking comfortably in full sentences, ill appearing  HEENT: NCAT. face symmetrical. PERRL 4mm, EOMI, MMM, oropharynx wnl.  Neck: no JVD, trachea midline, no lymphadenopathy, FROM  CV: RRR. +S1S2, no murmur. 2+ pulses in 4 extremities, cap refill <2 sec  Chest: CTA B/l. no wheezing, rales, rhonchi. no retractions. good air movement. no tenderness.   ABD: +BS, soft, non distended, non tender. No guarding/rebound.   : no cva or suprapubic tenderness  MSK: No clubbing, cyanosis, edema. FROM of all extremities. no tenderness to palpation. No midline or paraspinal tenderness. no spinal step-offs.  Neuro: AAOX3.  CN 2-12 intact; Sensation intact, motor 5/5 throughout.   SKIN: No rash

## 2023-06-22 NOTE — CONSULT NOTE ADULT - SUBJECTIVE AND OBJECTIVE BOX
HPI:  42-year-old female past medical history PCOS presents to ED for lightheadedness, sensation of near syncope, intermittent blurry vision, mild headache, altered sensation over her face, generalized fatigue.  Symptoms have been happening episodically for the past 2 to 3 days.  Patient does note increased stress 3 to 4 days ago but stress improved but still having symptoms.  No clear inciting events with episodes.  Patient notes while his episodes occurring having urinary frequency.  Does also report some urinary frequency outside of these episodes.  Does report increased hair loss over past several months as well as diarrhea/increased loose stools but not watery stools.  In ED patient denying visual hearing symptoms does feel very fatigued.  Denies any chest pain, palpitations, shortness of breath, cough, abdominal pain, dysuria or hematuria.  Denies any extremity numbness or focal weakness.  Denies any back pain.  Denies any alcohol, drugs, tobacco, marijuana.  Reports periods are irregular however has been having vaginal spotting monthly for the past several months.  Patient was in ED at [facility] 2 days ago and yesterday and [facility].  Work-up yesterday without significant abnormality.  Spoke with patient PCP Dr Aretha Nieto, who was concerned for underlying hormonal/pituitary abnormality and is concern for ability to expedite outpatient work-up.  Patient's son has history of vertigo, patient denies any room spinning or disequilibrium with her symptoms.  Patient today has had at least 2 episodes prompting her visit today.    REVIEW OF SYSTEMS  A 10-system ROS was performed and is negative except for those items noted above and/or in the HPI.    PAST MEDICAL & SURGICAL HISTORY:  PCOS (polycystic ovarian syndrome)      Cholelithiasis      Cholecystitis      S/P LASIK surgery of both eyes        FAMILY HISTORY:  Family history of diabetes mellitus in father (Father)    Family history of breast cancer (Grandparent)    Family history of skin cancer (Grandparent)      SOCIAL HISTORY:   T/E/D:   Occupation:   Lives with:     MEDICATIONS (HOME):  Home Medications:    MEDICATIONS  (STANDING):    MEDICATIONS  (PRN):    ALLERGIES/INTOLERANCES:  Allergies  No Known Allergies    Intolerances    VITALS & EXAMINATION:  Vital Signs Last 24 Hrs  T(C): 36.7 (2023 14:43), Max: 36.7 (2023 08:42)  T(F): 98.1 (2023 14:43), Max: 98.1 (2023 08:42)  HR: 62 (2023 14:43) (62 - 68)  BP: 105/64 (2023 14:43) (105/64 - 123/77)  BP(mean): --  RR: 15 (2023 14:43) (15 - 17)  SpO2: 100% (2023 14:43) (100% - 100%)    Parameters below as of 2023 14:43  Patient On (Oxygen Delivery Method): room air      General:  Constitutional: Obese Female, appears stated age, in no apparent distress including pain  Head: Normocephalic & atraumatic.  Respiratory: No increased work of breathing at rest  Extremities: No cyanosis, clubbing, or edema.  Skin: No rashes, bruising, or discoloration.    Neurological (>12):  MS: Awake, alert, oriented to person, place, situation, time. Normal affect. Follows all commands.    Language: Speech is clear, fluent with good repetition & comprehension (able to name objects___)    CNs: PERRLA (R = 3mm, L = 3mm). VFF. EOMI no nystagmus, no diplopia. V1-3 intact to LT/pinprick, well developed masseter muscles b/l. No facial asymmetry b/l, full eye closure strength b/l. Hearing grossly normal (rubbing fingers) b/l. Symmetric palate elevation in midline. Gag reflex deferred. Head turning & shoulder shrug intact b/l. Tongue midline, normal movements, no atrophy.    Motor: Normal muscle bulk & tone. No noticeable tremor or seizure. No pronator drift.              Deltoid	Biceps	Triceps	Wrist	Finger ABd	   R	5	5	5	5	5		5 	  L	5	5	5	5	5		5    	H-Flex	K-Flex	K-Ext	D-Flex	P-Flex  R	5	5	5	5	5	   L	5	5	5	5	5	     Sensation: Intact to LT/PP/Temp/Vibration/Position b/l throughout.     Cortical: Extinction on DSS (neglect): none    Reflexes:              Biceps(C5)       BR(C6)     Triceps(C7)               Patellar(L4)    Achilles(S1)    Plantar Resp  R	2	          2	             2		        2		    2		Down   L	2	          2	             2		        2		    2		Down     Coordination: intact rapid-alt movements. No dysmetria to FTN/HTS    Gait: Normal Romberg. No postural instability. Normal stance and gait.     LABORATORY:  CBC                       14.8   6.26  )-----------( 218      ( 2023 11:09 )             46.2     Chem     137  |  103  |  7   ----------------------------<  98  5.2   |  20<L>  |  0.63    Ca    9.3      2023 11:09  Phos  2.6       Mg     2.10         TPro  7.2  /  Alb  4.5  /  TBili  1.4<H>  /  DBili  x   /  AST  35<H>  /  ALT  31  /  AlkPhos  67      LFTs LIVER FUNCTIONS - ( 2023 11:09 )  Alb: 4.5 g/dL / Pro: 7.2 g/dL / ALK PHOS: 67 U/L / ALT: 31 U/L / AST: 35 U/L / GGT: x           U/A Urinalysis Basic - ( 2023 12:21 )    Color: Colorless / Appearance: Clear / S.008 / pH: x  Gluc: x / Ketone: Negative  / Bili: Negative / Urobili: <2 mg/dL   Blood: x / Protein: Negative / Nitrite: Negative   Leuk Esterase: Negative / RBC: x / WBC x   Sq Epi: x / Non Sq Epi: x / Bacteria: x      STUDIES & IMAGING:    < from: CT Head No Cont (23 @ 14:01) >  Findings:  The lateral ventricles have a normal configuration.    There is no evidence of acute hemorrhage, mass or mass-effect in the   posterior fossa or in the supratentorial region.    Evaluation of the osseous structures with the appropriate window appears   unremarkable.    The paranasal sinuses mastoid and middle ear regions appear clear.    Impression:  Unremarkable noncontrast head CT.    < end of copied text >   HPI:  42-year-old female w/ PCOS, s/p LASIK, s/p face lift with residual chin numbness presents to ED for intermittent episodes of multiple symptoms since she woke up on 23.  Symptoms have been happening episodically 5-6x/day for the past 2 to 3 days.  Patient does note increased stress 3 to 4 days ago but stress improved but still having symptoms.  No clear inciting events with episodes.  Patient the episodes last 15 minutes, with symptoms of diaphoresis (aileen over hands), lightheadedness, numbness over posterior/top of head, urinary frequency, sensation of near syncope, intermittent blurry vision, mild headache, generalized fatigue.  Does also report some urinary frequency outside of these episodes.  Does report increased hair loss over past several months as well as diarrhea/increased loose stools but not watery stools.  Pt denies any visual/ hearing symptoms, recent travel or sick contacts, fever, chills, chest pain, palpitations, shortness of breath, cough, abdominal pain, dysuria or hematuria, dysuria, focal weakness/numbness, back pain, neck pain, gait instability. Denies any alcohol, drugs, tobacco, marijuana.  Patient was in ED at [facility] 2 days ago and yesterday and [facility].  Work-up yesterday without significant abnormality.  Spoke with patient PCP Dr Aretha Nieto, who was concerned for underlying hormonal/pituitary abnormality and is concern for ability to expedite outpatient work-up.  Patient's son has history of vertigo, patient denies any room spinning or disequilibrium with her symptoms.  Patient today has had at least 2 episodes prompting her visit today.    REVIEW OF SYSTEMS  General: No fevers, chills, fatigue, weight loss  HEENT: No headaches, acute visual changes, rhinorrhea, sore throat, dysphagia  CVS: No chest pain, palpitations  Resp: No cough, dyspnea, wheezing  GI: No abdominal pain, nausea, vomiting, constipation, diarrhea   : No dysuria, frequency, hematuria, or discharge  MSK: No joint pain or swelling  Ext: No edema, no claudication  Skin: No rashes or itching  Heme: No easy bruising or petechiae  Neuro: No confusion,  focal weakness, or loss of consciousness  Endocrine: No excessive heat or cold symptoms, polyuria, polydipsia    PAST MEDICAL & SURGICAL HISTORY:  PCOS (polycystic ovarian syndrome)      Cholelithiasis      Cholecystitis      S/P LASIK surgery of both eyes        FAMILY HISTORY:  Family history of diabetes mellitus in father (Father)    Family history of breast cancer (Grandparent)    Family history of skin cancer (Grandparent)      SOCIAL HISTORY:   T/E/D:   Occupation:   Lives with:     MEDICATIONS (HOME):  Home Medications:    MEDICATIONS  (STANDING):    MEDICATIONS  (PRN):    ALLERGIES/INTOLERANCES:  Allergies  No Known Allergies    Intolerances    VITALS & EXAMINATION:  Vital Signs Last 24 Hrs  T(C): 36.7 (2023 14:43), Max: 36.7 (2023 08:42)  T(F): 98.1 (2023 14:43), Max: 98.1 (2023 08:42)  HR: 62 (2023 14:43) (62 - 68)  BP: 105/64 (2023 14:43) (105/64 - 123/77)  BP(mean): --  RR: 15 (2023 14:43) (15 - 17)  SpO2: 100% (2023 14:43) (100% - 100%)    Parameters below as of 2023 14:43  Patient On (Oxygen Delivery Method): room air      General:  Constitutional: Female, appears stated age, in no apparent distress including pain  Head: Normocephalic & atraumatic. slight proptosis  Respiratory: No increased work of breathing at rest  Extremities: No cyanosis, clubbing, or edema.  Skin: No rashes, bruising, or discoloration.    Neurological (>12):  MS: Awake, alert, oriented to person, place, situation, time. Normal affect. Follows all commands.    Language: Speech is clear, fluent with good repetition & comprehension (able to name objects mask, thumb)    CNs: PERRL (R = 3mm, L = 3mm), no APD. VFF. EOMI no nystagmus, no diplopia. V1-3 intact to LT, well developed masseter muscles b/l. No facial asymmetry b/l, full eye closure strength b/l. Hearing grossly normal (rubbing fingers) b/l. Symmetric palate elevation in midline. Gag reflex deferred. Head turning & shoulder shrug intact b/l. Tongue midline, normal movements, no atrophy.    Motor: Normal muscle bulk & tone. No noticeable tremor or seizure. No pronator drift.              Deltoid	Biceps	Triceps	Wrist	Finger ABd	   R	5	5	5	5	5		5 	  L	5	5	5	5	5		5    	H-Flex	K-Flex	K-Ext	D-Flex	P-Flex  R	5	5	5	5	5	   L	5	5	5	5	5	     Sensation: Intact to LT b/l throughout.     Cortical: Extinction on DSS (neglect): none    Reflexes: +b/l suprapatellars              Biceps(C5)       BR(C6)     Triceps(C7)               Patellar(L4)    Achilles(S1)    Plantar Resp  R	3	          3             3		        3		    2		Down   L	3	          3             3		        3		    2		Down     Coordination: intact rapid-alt movements. No dysmetria to FTN/HTS    Gait: Normal Romberg. No postural instability. Normal stance and gait.     LABORATORY:  CBC                       14.8   6.26  )-----------( 218      ( 2023 11:09 )             46.2     Chem -    137  |  103  |  7   ----------------------------<  98  5.2   |  20<L>  |  0.63    Ca    9.3      2023 11:09  Phos  2.6     -  Mg     2.10     -    TPro  7.2  /  Alb  4.5  /  TBili  1.4<H>  /  DBili  x   /  AST  35<H>  /  ALT  31  /  AlkPhos  67  -22    LFTs LIVER FUNCTIONS - ( 2023 11:09 )  Alb: 4.5 g/dL / Pro: 7.2 g/dL / ALK PHOS: 67 U/L / ALT: 31 U/L / AST: 35 U/L / GGT: x           U/A Urinalysis Basic - ( 2023 12:21 )    Color: Colorless / Appearance: Clear / S.008 / pH: x  Gluc: x / Ketone: Negative  / Bili: Negative / Urobili: <2 mg/dL   Blood: x / Protein: Negative / Nitrite: Negative   Leuk Esterase: Negative / RBC: x / WBC x   Sq Epi: x / Non Sq Epi: x / Bacteria: x      STUDIES & IMAGING:    < from: CT Head No Cont (23 @ 14:01) >  Findings:  The lateral ventricles have a normal configuration.    There is no evidence of acute hemorrhage, mass or mass-effect in the   posterior fossa or in the supratentorial region.    Evaluation of the osseous structures with the appropriate window appears   unremarkable.    The paranasal sinuses mastoid and middle ear regions appear clear.    Impression:  Unremarkable noncontrast head CT.    < end of copied text >

## 2023-06-22 NOTE — CONSULT NOTE ADULT - ASSESSMENT
42-year-old female w/ PCOS, s/p LASIK, s/p face lift with residual chin numbness presents to ED for intermittent episodes of multiple symptoms since she woke up on 6/20/23. Symptoms have been happening episodically 5-6x/day.  Patient does note increased stress 3 to 4 days ago.  Patient the episodes last 15 minutes, with symptoms of diaphoresis (aileen over hands), lightheadedness, numbness over posterior/top of head, urinary frequency, sensation of near syncope, intermittent blurry vision, mild headache, generalized fatigue. Neuro exam nonfocal. CTH neg.    Impression: recurrent episodes of lightheadedness, diaphoresis, head numbness 2/2 unclear etiology, possible endocrine . Low suspicion for demyelinating disease, stroke, large pituitary mass, or seizure.     Recommendations:  [] endocrine consulted, appreciate recs  [] cardiac workup per primary team  [] will f/u MRI brain w/o contrast  [] check orthostatics  [] consider outpatient autonomic testing if workup neg    Case to be seen and discussed with attending in AM 42-year-old female w/ PCOS, s/p LASIK, s/p face lift with residual chin numbness presents to ED for intermittent episodes of multiple symptoms since she woke up on 6/20/23. Symptoms have been happening episodically 5-6x/day.  Patient does note increased stress 3 to 4 days ago.  Patient the episodes last 15 minutes, with symptoms of diaphoresis (aileen over hands), lightheadedness, numbness over posterior/top of head, urinary frequency, sensation of near syncope, intermittent blurry vision, mild headache, generalized fatigue. Neuro exam nonfocal. CTH neg.    Impression: recurrent episodes of lightheadedness, diaphoresis, head numbness 2/2 unclear etiology, possible endocrine vs. cardiac etiology. Low suspicion for demyelinating disease, stroke, large pituitary mass, or seizure.     Recommendations:  [] endocrine consulted, appreciate recs  [] cardiac workup per primary team  [] will f/u MRI brain w/o contrast  [] check orthostatics  [] consider outpatient autonomic testing if workup neg    Case to be seen and discussed with attending in AM

## 2023-06-22 NOTE — ED CDU PROVIDER INITIAL DAY NOTE - CLINICAL SUMMARY MEDICAL DECISION MAKING FREE TEXT BOX
Patient with constellation of symptoms including near syncope, blurry vision, generalized weakness, numbness, mild headache, focal sensory changes over the face as well as several months of loose stools and hair loss.  Patient without focal neurodeficit but does appear fatigued.  Was in ED yesterday lab work at that time without significant abnormality CT brain without significant abnormality.  Patient with persistence of symptoms returning to ED today denies any vision changes at this time.  Doubt CVA as etiology of symptoms.  Concern for endocrinopathy.  TSH normal yesterday, will repeat additional TFTs as well as other labs.  Primary care provider concerned about patient as well as ability to expedite outpatient work-up.    Labs without significant abnormality.  Endocrinology consulted and will see patient pending further recommendations.  Patient pending MRI.

## 2023-06-22 NOTE — ED ADULT NURSE NOTE - NSFALLUNIVINTERV_ED_ALL_ED
Bed/Stretcher in lowest position, wheels locked, appropriate side rails in place/Call bell, personal items and telephone in reach/Instruct patient to call for assistance before getting out of bed/chair/stretcher/Non-slip footwear applied when patient is off stretcher/[place] to call system/Physically safe environment - no spills, clutter or unnecessary equipment/Purposeful proactive rounding/Room/bathroom lighting operational, light cord in reach

## 2023-06-22 NOTE — CONSULT NOTE ADULT - SUBJECTIVE AND OBJECTIVE BOX
ENDOCRINE INITIAL CONSULT - concern for panhypopituitarism    HPI:  Ms. Merchant is a 42 year old female with a PMHx of PCOS who presents to the ED with concerns of lightheadedness, dizziness, syncope, headache.      ENDOCRINE HISTORY     Endocrinologist:  Social History:  Family History:  Surgical History:  Insurance:  Resides In:    PAST MEDICAL & SURGICAL HISTORY:  PCOS (polycystic ovarian syndrome)      Cholelithiasis      Cholecystitis      S/P LASIK surgery of both eyes          FAMILY HISTORY:  Family history of diabetes mellitus in father (Father)    Family history of breast cancer (Grandparent)    Family history of skin cancer (Grandparent)        Social History:      Home Medications:      MEDICATIONS  (STANDING):    MEDICATIONS  (PRN):      Allergies    No Known Allergies    Intolerances        REVIEW OF SYSTEMS  Constitutional: No fever  Eyes: No blurry vision  Neuro: No tremors  HEENT: No pain  Cardiovascular: No chest pain, palpitations  Respiratory: No SOB, no cough  GI: No nausea, vomiting, abdominal pain  : No dysuria  Skin: no rash  Psych: no depression  Endocrine: no polyuria, polydipsia  Hem/lymph: no swelling  Osteoporosis: no fractures  ALL OTHER SYSTEMS REVIEWED AND NEGATIVE     UNABLE TO OBTAIN     PHYSICAL EXAM   Vital Signs Last 24 Hrs  T(C): 36.7 (22 Jun 2023 08:42), Max: 36.9 (21 Jun 2023 14:01)  T(F): 98.1 (22 Jun 2023 08:42), Max: 98.4 (21 Jun 2023 14:01)  HR: 68 (22 Jun 2023 08:42) (68 - 77)  BP: 123/77 (22 Jun 2023 08:42) (118/68 - 123/77)  BP(mean): --  RR: 17 (22 Jun 2023 08:42) (15 - 17)  SpO2: 100% (22 Jun 2023 08:42) (100% - 100%)    Parameters below as of 22 Jun 2023 08:42  Patient On (Oxygen Delivery Method): room air      GENERAL: NAD, well-groomed, well-developed  EYES: No proptosis, no lid lag, anicteric  HEENT:  Atraumatic, Normocephalic, moist mucous membranes  THYROID: Normal size, no palpable nodules  RESPIRATORY: Clear to auscultation bilaterally; No rales, rhonchi, wheezing  CARDIOVASCULAR: Regular rate and rhythm; No murmurs; no peripheral edema  GI: Soft, nontender, non distended, normal bowel sounds  SKIN: Dry, intact, No rashes or lesions  MUSCULOSKELETAL: Full range of motion, normal strength  NEURO: sensation intact, extraocular movements intact, no tremor  PSYCH: Alert and oriented x 3, normal affect, normal mood  CUSHING'S SIGNS: no striae    CAPILLARY BLOOD GLUCOSE      POCT Blood Glucose.: 98 mg/dL (22 Jun 2023 08:45)                                14.8   6.26  )-----------( 218      ( 22 Jun 2023 11:09 )             46.2       06-22    137  |  103  |  7   ----------------------------<  98  5.2   |  20<L>  |  0.63    Ca    9.3      22 Jun 2023 11:09  Phos  2.6     06-22  Mg     2.10     06-22    TPro  7.2  /  Alb  4.5  /  TBili  1.4<H>  /  DBili  x   /  AST  35<H>  /  ALT  31  /  AlkPhos  67  06-22      Thyroid Stimulating Hormone, Serum: 0.76 uIU/mL (06-22-23 @ 11:09)  Thyroid Stimulating Hormone, Serum: 2.80 uIU/mL (06-21-23 @ 07:05)      LIPIDS    RADIOLOGY ENDOCRINE INITIAL CONSULT - concern for panhypopituitarism    HPI:  Ms. Merchant is a 42 year old female with a PMHx of PCOS, cholecystectomy who presents to the ED with concerns of lightheadedness, dizziness, syncope, headache.    ENDOCRINE HISTORY   The patient reports a history of PCOS diagnosed 22 years ago during her first pregnancy through US. She was put on metformin and took it for a bit and stopped. She reports she had another child after. She reports irregular periods that occur every 3-4 months which she reports are always irregular starting from 12-13 years old when she first developed periods. She denies hirsutism, acanthosis, reports stretch marks after pregnancy. She reports she is losing a lot of hair, alternating diarrhea and constipation which she attributes to her history of cholecystectomy, fatigue, lightheadedness, dizziness polyuria every 2 minutes during episodes of numbness in her head which occurred 4-5 times today since Tuesday with associated shakiness in hands, legs, blurry vision, neck pain, dysphagia, dysphonia, denies nausea, vomiting, unexplained weight changes, heat or cold intolerance, skin changes, syncope. She does not take any medications. She reports she saw an endocrinology PA in [place] last year for weight loss last year, reports her hormones were checked and everything was okay, no medications prescribed.    Social History: denies tobacco use, alcohol use, recreational drug use or marijuana  Family History: endorses father with diabetes, denies family history of thyroid disease, pituitary disorder, adrenal disorder  Surgical History: as above  Insurance: [facility]  Resides In: [place]    PAST MEDICAL & SURGICAL HISTORY:  PCOS (polycystic ovarian syndrome)      Cholelithiasis      Cholecystitis      S/P LASIK surgery of both eyes          FAMILY HISTORY:  Family history of diabetes mellitus in father (Father)    Family history of breast cancer (Grandparent)    Family history of skin cancer (Grandparent)        Social History:      Home Medications:      MEDICATIONS  (STANDING):    MEDICATIONS  (PRN):      Allergies    No Known Allergies    Intolerances        REVIEW OF SYSTEMS  Constitutional: No fever  Eyes: endorses blurry vision  Neuro: endorses tremors  HEENT: No pain  Cardiovascular: No chest pain, palpitations  Respiratory: No SOB, no cough  GI: No nausea, vomiting, abdominal pain, endorses diarrhea, constipation  : No dysuria  Skin: no rash  Psych: no depression  Endocrine: endorses polyuria, denies polydipsia  Hem/lymph: no swelling  Osteoporosis: no fractures  ALL OTHER SYSTEMS REVIEWED AND NEGATIVE       PHYSICAL EXAM   Vital Signs Last 24 Hrs  T(C): 36.7 (22 Jun 2023 08:42), Max: 36.9 (21 Jun 2023 14:01)  T(F): 98.1 (22 Jun 2023 08:42), Max: 98.4 (21 Jun 2023 14:01)  HR: 68 (22 Jun 2023 08:42) (68 - 77)  BP: 123/77 (22 Jun 2023 08:42) (118/68 - 123/77)  BP(mean): --  RR: 17 (22 Jun 2023 08:42) (15 - 17)  SpO2: 100% (22 Jun 2023 08:42) (100% - 100%)    Parameters below as of 22 Jun 2023 08:42  Patient On (Oxygen Delivery Method): room air      GENERAL: NAD, well-groomed, well-developed  EYES: No proptosis, no lid lag, anicteric  HEENT:  Atraumatic, Normocephalic, moist mucous membranes  THYROID: Normal size, no palpable nodules  RESPIRATORY: Clear to auscultation bilaterally; No rales, rhonchi, wheezing  CARDIOVASCULAR: Regular rate and rhythm; No murmurs; no peripheral edema  GI: Soft, nontender, non distended, normal bowel sounds  SKIN: Dry, intact, No rashes or lesions  MUSCULOSKELETAL: Full range of motion, normal strength  NEURO: sensation intact, extraocular movements intact, no tremor  PSYCH: Alert and oriented x 3, normal affect, normal mood  CUSHING'S SIGNS: no striae    CAPILLARY BLOOD GLUCOSE      POCT Blood Glucose.: 98 mg/dL (22 Jun 2023 08:45)                                14.8   6.26  )-----------( 218      ( 22 Jun 2023 11:09 )             46.2       06-22    137  |  103  |  7   ----------------------------<  98  5.2   |  20<L>  |  0.63    Ca    9.3      22 Jun 2023 11:09  Phos  2.6     06-22  Mg     2.10     06-22    TPro  7.2  /  Alb  4.5  /  TBili  1.4<H>  /  DBili  x   /  AST  35<H>  /  ALT  31  /  AlkPhos  67  06-22      Thyroid Stimulating Hormone, Serum: 0.76 uIU/mL (06-22-23 @ 11:09)  Thyroid Stimulating Hormone, Serum: 2.80 uIU/mL (06-21-23 @ 07:05)      LIPIDS    RADIOLOGY ENDOCRINE INITIAL CONSULT - concern for panhypopituitarism    HPI:  Ms. Merchant is a 42 year old female with a PMHx of PCOS, cholecystectomy who presents to the ED with concerns of lightheadedness, dizziness, syncope, headache.    ENDOCRINE HISTORY   The patient reports a history of PCOS diagnosed 22 years ago during her first pregnancy through US. She was put on metformin and took it for a bit and stopped. She reports she had another child after. She reports irregular periods that occur every 3-4 months which she reports are always irregular starting from 12-13 years old when she first developed periods. She denies hirsutism, acanthosis, reports stretch marks after pregnancy. She reports she is losing a lot of hair, alternating diarrhea and constipation which she attributes to her history of cholecystectomy, fatigue, lightheadedness, dizziness polyuria every 2 minutes during episodes of numbness in her head which occurred 4-5 times today since Tuesday with associated shakiness in hands, legs, blurry vision, polydipsia, neck pain, dysphagia, dysphonia, denies nausea, vomiting, unexplained weight changes, heat or cold intolerance, skin changes, syncope. She does not take any medications. She reports she saw an endocrinology PA in [place] last year for weight loss last year, reports her hormones were checked and everything was okay, no medications prescribed. She reports had a procedure a face lift performed last year.    Social History: denies tobacco use, alcohol use, recreational drug use or marijuana  Family History: endorses father with diabetes, denies family history of thyroid disease, pituitary disorder, adrenal disorder  Surgical History: as above  Insurance: [facility]  Resides In: [place]    PAST MEDICAL & SURGICAL HISTORY:  PCOS (polycystic ovarian syndrome)      Cholelithiasis      Cholecystitis      S/P LASIK surgery of both eyes          FAMILY HISTORY:  Family history of diabetes mellitus in father (Father)    Family history of breast cancer (Grandparent)    Family history of skin cancer (Grandparent)        Social History:      Home Medications:      MEDICATIONS  (STANDING):    MEDICATIONS  (PRN):      Allergies    No Known Allergies    Intolerances        REVIEW OF SYSTEMS  Constitutional: No fever  Eyes: endorses blurry vision  Neuro: endorses tremors  HEENT: No pain  Cardiovascular: No chest pain, palpitations  Respiratory: No SOB, no cough  GI: No nausea, vomiting, abdominal pain, endorses diarrhea, constipation  : No dysuria  Skin: no rash  Psych: no depression  Endocrine: endorses polyuria, denies polydipsia  Hem/lymph: no swelling  Osteoporosis: no fractures  ALL OTHER SYSTEMS REVIEWED AND NEGATIVE       PHYSICAL EXAM   Vital Signs Last 24 Hrs  T(C): 36.7 (22 Jun 2023 08:42), Max: 36.9 (21 Jun 2023 14:01)  T(F): 98.1 (22 Jun 2023 08:42), Max: 98.4 (21 Jun 2023 14:01)  HR: 68 (22 Jun 2023 08:42) (68 - 77)  BP: 123/77 (22 Jun 2023 08:42) (118/68 - 123/77)  BP(mean): --  RR: 17 (22 Jun 2023 08:42) (15 - 17)  SpO2: 100% (22 Jun 2023 08:42) (100% - 100%)    Parameters below as of 22 Jun 2023 08:42  Patient On (Oxygen Delivery Method): room air      GENERAL: Tired appearing, sitting up in bed  EYES: No proptosis, no lid lag, anicteric  HEENT:  Atraumatic, Normocephalic, mildly dry mucous membranes  THYROID: Normal size, no palpable nodules  RESPIRATORY: Clear to auscultation bilaterally; No rales, rhonchi, wheezing  CARDIOVASCULAR: Regular rate and rhythm; No murmurs; no peripheral edema  GI: Soft, nontender, non distended, normal bowel sounds  SKIN: Dry, intact, No rashes or lesions  MUSCULOSKELETAL: moving extremities spontaneously, no peripheral edema  NEURO: sensation intact on plantar surface of foot, extraocular movements intact, no tremor  PSYCH: Answering questions appropriately, flat affect and mood  CUSHING'S SIGNS: no striae, no acanthosis, no dorsocervical fat pad    CAPILLARY BLOOD GLUCOSE      POCT Blood Glucose.: 98 mg/dL (22 Jun 2023 08:45)                                14.8   6.26  )-----------( 218      ( 22 Jun 2023 11:09 )             46.2       06-22    137  |  103  |  7   ----------------------------<  98  5.2   |  20<L>  |  0.63    Ca    9.3      22 Jun 2023 11:09  Phos  2.6     06-22  Mg     2.10     06-22    TPro  7.2  /  Alb  4.5  /  TBili  1.4<H>  /  DBili  x   /  AST  35<H>  /  ALT  31  /  AlkPhos  67  06-22      Thyroid Stimulating Hormone, Serum: 0.76 uIU/mL (06-22-23 @ 11:09)  Thyroid Stimulating Hormone, Serum: 2.80 uIU/mL (06-21-23 @ 07:05)      LIPIDS    RADIOLOGY ENDOCRINE INITIAL CONSULT - concern for panhypopituitarism    HPI:  Ms. Merchant is a 42 year old female with a PMHx of PCOS, cholecystectomy who presents to the ED with concerns of lightheadedness, dizziness, headache.    ENDOCRINE HISTORY   The patient reports a history of PCOS diagnosed 22 years ago during her first pregnancy through US. She was put on metformin and took it for a bit and stopped. She reports she had another child after. She reports irregular periods that occur every 3-4 months which she reports are always irregular starting from 12-13 years old when she first developed periods. She denies hirsutism, acanthosis, reports stretch marks after pregnancy. She reports she is losing a lot of hair, alternating diarrhea and constipation which she attributes to her history of cholecystectomy, fatigue, lightheadedness, dizziness polyuria every 2 minutes during episodes of numbness in her head which occurred 4-5 times today since Tuesday (2 days prior to admission) with associated shakiness in hands, legs, blurry vision, polydipsia, denies neck pain, dysphagia, dysphonia, denies nausea, vomiting, unexplained weight changes, heat or cold intolerance, skin changes, syncope. She does not take any medications. She reports she saw an endocrinology PA in [place] last year for weight loss last year, reports her hormones were checked and everything was okay, no medications prescribed. She reports had a procedure a face lift performed last year.    Social History: denies tobacco use, alcohol use, recreational drug use or marijuana  Family History: endorses father with diabetes, denies family history of thyroid disease, pituitary disorder, adrenal disorder  Surgical History: as above  Insurance: [facility]  Resides In: [place]    PAST MEDICAL & SURGICAL HISTORY:  PCOS (polycystic ovarian syndrome)      Cholelithiasis      Cholecystitis      S/P LASIK surgery of both eyes          FAMILY HISTORY:  Family history of diabetes mellitus in father (Father)    Family history of breast cancer (Grandparent)    Family history of skin cancer (Grandparent)        Social History:      Home Medications:      MEDICATIONS  (STANDING):    MEDICATIONS  (PRN):      Allergies    No Known Allergies    Intolerances        REVIEW OF SYSTEMS  Constitutional: No fever  Eyes: endorses blurry vision  Neuro: endorses tremors  HEENT: No pain  Cardiovascular: No chest pain, palpitations  Respiratory: No SOB, no cough  GI: No nausea, vomiting, abdominal pain, endorses diarrhea, constipation  : No dysuria  Skin: no rash  Psych: no depression  Endocrine: endorses polyuria, denies polydipsia  Hem/lymph: no swelling  Osteoporosis: no fractures  ALL OTHER SYSTEMS REVIEWED AND NEGATIVE       PHYSICAL EXAM   Vital Signs Last 24 Hrs  T(C): 36.7 (22 Jun 2023 08:42), Max: 36.9 (21 Jun 2023 14:01)  T(F): 98.1 (22 Jun 2023 08:42), Max: 98.4 (21 Jun 2023 14:01)  HR: 68 (22 Jun 2023 08:42) (68 - 77)  BP: 123/77 (22 Jun 2023 08:42) (118/68 - 123/77)  BP(mean): --  RR: 17 (22 Jun 2023 08:42) (15 - 17)  SpO2: 100% (22 Jun 2023 08:42) (100% - 100%)    Parameters below as of 22 Jun 2023 08:42  Patient On (Oxygen Delivery Method): room air      GENERAL: Tired appearing, sitting up in bed  EYES: No proptosis, no lid lag, anicteric  HEENT:  Atraumatic, Normocephalic, mildly dry mucous membranes  THYROID: Normal size, no palpable nodules  RESPIRATORY: Clear to auscultation bilaterally; No rales, rhonchi, wheezing  CARDIOVASCULAR: Regular rate and rhythm; No murmurs; no peripheral edema  GI: Soft, nontender, non distended, normal bowel sounds  SKIN: Dry, intact, No rashes or lesions  MUSCULOSKELETAL: moving extremities spontaneously, no peripheral edema  NEURO: sensation intact on plantar surface of foot, extraocular movements intact, no tremor  PSYCH: Answering questions appropriately, flat affect and mood  CUSHING'S SIGNS: no striae, no acanthosis, no dorsocervical fat pad    CAPILLARY BLOOD GLUCOSE      POCT Blood Glucose.: 98 mg/dL (22 Jun 2023 08:45)                                14.8   6.26  )-----------( 218      ( 22 Jun 2023 11:09 )             46.2       06-22    137  |  103  |  7   ----------------------------<  98  5.2   |  20<L>  |  0.63    Ca    9.3      22 Jun 2023 11:09  Phos  2.6     06-22  Mg     2.10     06-22    TPro  7.2  /  Alb  4.5  /  TBili  1.4<H>  /  DBili  x   /  AST  35<H>  /  ALT  31  /  AlkPhos  67  06-22      Thyroid Stimulating Hormone, Serum: 0.76 uIU/mL (06-22-23 @ 11:09)  Thyroid Stimulating Hormone, Serum: 2.80 uIU/mL (06-21-23 @ 07:05)      LIPIDS    RADIOLOGY

## 2023-06-22 NOTE — ED CDU PROVIDER INITIAL DAY NOTE - OBJECTIVE STATEMENT
42-year-old female past medical history PCOS presents to ED for lightheadedness, sensation of near syncope, intermittent blurry vision, mild headache, altered sensation over her face, generalized fatigue.  Symptoms have been happening episodically for the past 2 to 3 days.  Patient does note increased stress 3 to 4 days ago but stress improved but still having symptoms.  No clear inciting events with episodes.  Patient notes while his episodes occurring having urinary frequency.  Does also report some urinary frequency outside of these episodes.  Does report increased hair loss over past several months as well as diarrhea/increased loose stools but not watery stools.  In ED patient denying visual hearing symptoms does feel very fatigued.  Denies any chest pain, palpitations, shortness of breath, cough, abdominal pain, dysuria or hematuria.  Denies any extremity numbness or focal weakness.  Denies any back pain.  Denies any alcohol, drugs, tobacco, marijuana.  Reports periods are irregular however has been having vaginal spotting monthly for the past several months.  Patient was in ED at [facility] 2 days ago and yesterday and [facility].  Work-up yesterday without significant abnormality.  Spoke with patient PCP Dr Aretha Nieto, who was concerned for underlying hormonal/pituitary abnormality and is concern for ability to expedite outpatient work-up.  Patient's son has history of vertigo, patient denies any room spinning or disequilibrium with her symptoms.  Patient today has had at least 2 episodes prompting her visit today.

## 2023-06-22 NOTE — CONSULT NOTE ADULT - ATTENDING COMMENTS
I interviewed the patient, discussed the case with the Resident and agree with the findings and plan as documented in the Resident's note except below.  During my evaluation, patient denies for headache but reported constellation of symptoms including recurrent episodes of lightheadedness, diaphoresis, head numbness and fatigue.   On exam: non focal exam and able to walk independently without any difficulty.   Impression and Plan:  Somewhat difficult to explain constellation of symptoms and clinically less suspicious for MS and stroke. Patient would benefit from rule out metabolic work up.  MRI brain did not showed any acute pathology except 0.2 cm focus of hypoenhancement in the left pituitary gland.   Differential includes tiny Rathke's cleft cyst, microadenoma, or artifact.  Continue medical management, neuro- check and fall precaution.  I discussed the diagnosis and treatment plan with the patient. All questions and concerns were addressed. The patient demonstrated good understanding of the treatment plan.  If you have any further questions, please do not hesitate to contact our team.  Thank you for allowing us to participate in this patient care.
Ms. Merchant is a 42 year old female with a PMHx of PCOS who presents to the ED with concerns of lightheadedness, dizziness, syncope, headache. Endocrinology consulted for concern for panhypopituitarism. From endocrine standpoint, when thinking of generalized fatigue and lightheadeadness, we want to rule out thyroid disease, AI and hypoglycemia. BG have been stable. TSH noted to be stable ruling out thyroid disease as an etiology. Can check AM serum cortisol to rule out AI. Low suspicion given no recent steroid use, normal Na, no hypoglycemia.    Myah Sullivan DO   Attending Physician   Department of Endocrinology, Diabetes and Metabolism     If before 9AM or after 5PM, or on weekends/holidays, please call the Endocrine answering service for assistance ([phone]).  For nonurgent matters, please email [email or web address] for assistance.

## 2023-06-22 NOTE — ED PROVIDER NOTE - OBJECTIVE STATEMENT
42-year-old female past medical history PCOS presents to ED for lightheadedness, sensation of near syncope, intermittent blurry vision, mild headache, altered sensation over her face, generalized fatigue.  Symptoms have been happening episodically for the past 2 to 3 days.  Patient does note increased stress 3 to 4 days ago but stress improved but still having symptoms.  No clear inciting events with episodes.  Patient notes while his episodes occurring having urinary frequency.  Does also report some urinary frequency outside of these episodes.  Does report increased hair loss over past several months as well as diarrhea/increased loose stools but not watery stools.  In ED patient denying visual hearing symptoms does feel very fatigued.  Denies any chest pain, palpitations, shortness of breath, cough, abdominal pain, dysuria or hematuria.  Denies any extremity numbness or focal weakness.  Denies any back pain.  Denies any alcohol, drugs, tobacco, marijuana.  Reports periods are irregular however has been having vaginal spotting monthly for the past several months.  Patient was in ED at [facility] 2 days ago and yesterday and [facility].  Work-up yesterday without significant abnormality.  Spoke with patient PCP Dr Aretha Nieto, who was concerned for underlying hormonal/pituitary abnormality and is concern for ability to expedite outpatient work-up.  Patient's son has history of vertigo, patient denies any room spinning or disequilibrium with her symptoms.  Patient today has had at least 2 episodes prompting her visit today.

## 2023-06-22 NOTE — ED PROVIDER NOTE - NS ED ROS FT
Constitutional: No fever. no chills. no unexpected weight loss/gain  Eyes: + visual changes, eye pain or redness  HEENT: No throat pain, hearing changes, ear pain, nasal pain. No nose bleeding.  CV: No chest pain, no palpitations  Resp: No shortness of breath, no cough  GI: No abdominal pain. No nausea. no  vomiting. No diarrhea. No constipation.   : No dysuria, no hematuria. + urinary frequency, no urinary urgency.  MSK: No musculoskeletal pain  Skin: No rash, lesions, no bruises  Neuro: + headache. +episodic numbness or tingling. No weakness. No dizziness.  Heme: no easy bruising.  Allergy/Immunology: no allergy to medicine

## 2023-06-22 NOTE — ED ADULT NURSE NOTE - OBJECTIVE STATEMENT
Patient received in stretcher. AOX4. Respirations even and unlabored. Spontaneous movement of all extremities noted. Presents to ER c/o " I feel like I am going to faint" Patient reports no episodes syncopal events. + weakness minimal when answering questions. Repeatedly states " I keep coming in for the same thing but no one can find anything wrong" appears lethargic IV placed. Labs drawn. Fluids infusing Comfort and safety maintained. All current care needs met. Care plan continued Crystal HERNÁNDEZ

## 2023-06-22 NOTE — CONSULT NOTE ADULT - ASSESSMENT
Ms. Merchant is a 42 year old female with a PMHx of PCOS who presents to the ED with concerns of lightheadedness, dizziness, syncope, headache. Endocrinology consulted for concern for panhypopituitarism.    #Concern for panhypopituitarism  #Lightheadedness, dizziness  - CT head unremarkable  - Na 137, K 5.2, TSH 0.76, serum T4 8.08    PLAN    #Hx PCOS    Discussed with primary team.     Thank you for the interesting consult.    Garret Richey MD, Endocrinology Fellow  Pager [phone] from 9am to 5pm. After hours and on weekends, please call [phone]. Ms. Merchant is a 42 year old female with a PMHx of PCOS who presents to the ED with concerns of lightheadedness, dizziness, syncope, headache. Endocrinology consulted for concern for panhypopituitarism.    #Concern for panhypopituitarism  #Lightheadedness, dizziness  - CT head unremarkable  - Na 137, K 5.2,   - TSH 0.76, serum T4 8.08, WNL, making thyroid disease unlikely  - patient endorses fatigue, head numbness, lightheadedness, dizziness, weight gain but denies symptoms of nausea, vomiting, weight loss that could be attributed to AI  - only endorsing polyuria during episodes of head numbness which started two days ago, less suspicion for central DI  - does endorse blurry vision  - low suspicion for pituitary etiology of symptoms  PLAN  - can check 8am SERUM NOT FREE cortisol and ACTH to screen for adrenal insufficiency but low suspicion  - defer imaging and additional workup to primary team and neurology    #Hx PCOS  - patient reports irregular periods every 3-4 months since age 12-13 years when first started developing periods  - reports PCOS discovered on US when pregnant with first child  - has had 2 children  - previously on metformin for a brief period of time  - no hirsutism, acanthosis, striae, dorsocervical fat pad  - BG on BMP 98 and 118  PLAN  - can check HbA1c  - outpatient management of PCOS    Discussed with primary team.     Thank you for the interesting consult.    Garret Richey MD, Endocrinology Fellow  Pager [phone] from 9am to 5pm. After hours and on weekends, please call [phone].

## 2023-06-23 ENCOUNTER — OUTPATIENT (OUTPATIENT)
Dept: OUTPATIENT SERVICES | Facility: HOSPITAL | Age: 43
LOS: 1 days | Discharge: HOME | End: 2023-06-23

## 2023-06-23 VITALS
SYSTOLIC BLOOD PRESSURE: 116 MMHG | TEMPERATURE: 98 F | HEART RATE: 68 BPM | DIASTOLIC BLOOD PRESSURE: 74 MMHG | OXYGEN SATURATION: 100 % | RESPIRATION RATE: 17 BRPM

## 2023-06-23 DIAGNOSIS — Z98.890 OTHER SPECIFIED POSTPROCEDURAL STATES: Chronic | ICD-10-CM

## 2023-06-23 LAB
ACTH SER-ACNC: 8.2 PG/ML — SIGNIFICANT CHANGE UP (ref 7.2–63.3)
B BURGDOR C6 AB SER-ACNC: NEGATIVE — SIGNIFICANT CHANGE UP
B BURGDOR IGG+IGM SER-ACNC: 0.15 INDEX — SIGNIFICANT CHANGE UP (ref 0.01–0.89)
CORTIS AM PEAK SERPL-MCNC: 10.2 UG/DL — SIGNIFICANT CHANGE UP (ref 6–18.4)
CULTURE RESULTS: SIGNIFICANT CHANGE UP
SPECIMEN SOURCE: SIGNIFICANT CHANGE UP

## 2023-06-23 PROCEDURE — 93306 TTE W/DOPPLER COMPLETE: CPT | Mod: 26

## 2023-06-23 PROCEDURE — 99283 EMERGENCY DEPT VISIT LOW MDM: CPT | Mod: GC

## 2023-06-23 PROCEDURE — 99238 HOSP IP/OBS DSCHRG MGMT 30/<: CPT

## 2023-06-23 NOTE — ED CDU PROVIDER SUBSEQUENT DAY NOTE - PROGRESS NOTE DETAILS
Pt feeling better after CDu stay, labs and MRI neg for acute pathology. Pt seen and eval by neuro and cleared for dc. Endo cleared for dc as well and can f/u in the clinic. stable for dc.

## 2023-06-23 NOTE — ED CDU PROVIDER DISPOSITION NOTE - PATIENT PORTAL LINK FT
You can access the FollowMyHealth Patient Portal offered by [facility] by registering at the following website: [email or web address]. By joining [facility]’s FollowMyHealth portal, you will also be able to view your health information using other applications (apps) compatible with our system.

## 2023-06-23 NOTE — ED CDU PROVIDER SUBSEQUENT DAY NOTE - HISTORY
41 yo female PMH PCOS presents to ED for lightheadedness, sensation of near syncope, intermittent blurry vision, mild headaches, altered sensation over her face, generalized fatigue, urinary frequency  x 2 - 3 days, increased hair loss x several months as well as increased loose stools.  ROS negative for chest pain, palpitations, shortness of breath, cough, abdominal pain, dysuria, hematuria, focal weakness, back pain.  Hx/o irregular menses with vaginal spotting monthly x several months.  Patient was in ED at [facility] 2 days ago and yesterday and [facility].  Patient had two prior ED visits for these symptoms; states her PCP Dr Aretha Nieto was concerned for underlying hormonal/pituitary abnormality, prompting pt to return to ED for reevaluation.  In the ED, Neuro and Endocrine were consulted and recommendations appreciated; pt was sent to CDU for continued care.  Pt had MRI head/sella imaging performed; official radiology reports pending.

## 2023-06-23 NOTE — ED CDU PROVIDER SUBSEQUENT DAY NOTE - ATTENDING APP SHARED VISIT CONTRIBUTION OF CARE
Rudi Kaur DO:  patient seen and evaluated with the PA.  I was present for key portions of the History & Physical, and I agree with the Impression & Plan. 44 yo f pmh PCOS, s/p LASIK, s/p face lift with residual chin numbness, pw malaise. reports has been having episodes of lightheadedness, near syncope, fatigue, increased urination. acute on chronic. has been f/u outpatient however due to persistence and inability to obtain imaging, was recommended to ED. cdu for endo and neuro. denies acute sx this morning. will obtain TTE for pt given near syncopal episodes.

## 2023-06-23 NOTE — ED CDU PROVIDER SUBSEQUENT DAY NOTE - CLINICAL SUMMARY MEDICAL DECISION MAKING FREE TEXT BOX
MRI head/sella, recommendations as per Neuro and Endocrine teams following pt, general observation care / monitoring.

## 2023-06-23 NOTE — ED CDU PROVIDER DISPOSITION NOTE - CLINICAL COURSE
43 y/o female no pmh c/o lightheadedness, near syncope and generalized fatigue/weakness. Pts PMD was concerned for possibly pan hypopituitarism and pt had Endo and neuro eval. Pt had normal CT head and was sent to CDU for MRI. pt was seen and eval by endo who recommended pituitary labs. AM cortisol was normal and endocrine cleared for d/c with outpatient f/u. Spoke with pts PMD about normal results and she is concerned for possible anxiety/depression. Pt is not actively suicidal or homicidal and no hallucinations. [facility] crisis center supplied to pt. stable for dc.,

## 2023-06-23 NOTE — ED CDU PROVIDER SUBSEQUENT DAY NOTE - NS ED ATTENDING STATEMENT MOD
This was a shared visit with the SMOOTH. I reviewed and verified the documentation and independently performed the documented:
no

## 2023-06-23 NOTE — ED CDU PROVIDER DISPOSITION NOTE - ATTENDING CONTRIBUTION TO CARE
Rudi Kaur DO:  patient seen and evaluated with the PA.  I was present for key portions of the History & Physical, and I agree with the Impression & Plan. 42 yo f pmh PCOS, s/p LASIK, s/p face lift with residual chin numbness, pw malaise. reports has been having episodes of lightheadedness, near syncope, fatigue, increased urination. acute on chronic. has been f/u outpatient however due to persistence and inability to obtain imaging, was recommended to ED. cdu for endo and neuro. denies acute sx this morning. TTE results reviewed, reported negative.  All results explained to patient and family bedside.  Case discussed with patient's PCP over the phone.  PCP requesting clonazepam prescription sent for patient, declined to do so, recommended PCP to do so if they so wish.  Patient with no SI/HI.  No hallucinations.  DC with strict return precautions.

## 2023-06-23 NOTE — ED CDU PROVIDER SUBSEQUENT DAY NOTE - PHYSICAL EXAMINATION
CONSTITUTIONAL:  Well appearing, awake, alert, oriented to person, place, time/situation and in no apparent distress.  Pt. is objectively comfortable appearing and verbalizing in full, clear, effortless sentences.  ENMT: NC/AT.  Airway patent.  Nasal mucosa clear.  Moist mucous membranes.  Neck supple.  EYES:  Clear OU.  CARDIAC:  Normal rate, regular rhythm.  Heart sounds S1 S2.  No murmurs, gallops, or rubs.  RESPIRATORY:  Breath sounds clear and equal bilaterally.  No wheezes, no rales, no rhonchi.  GASTROINTESTINAL:  Abdomen soft, non-distended, non-tender.  No rebound, no guarding.  NEUROLOGICAL:  Alert and oriented to person/place/time/situation.  No focal deficits; no tremors noted.   MUSCULOSKELETAL:  Range of motion is not limited.    SKIN:  Skin color unremarkable.  Skin warm, dry, and intact.    PSYCHIATRIC:  Alert and oriented to person/place/time/situation.  Pt pleasant, cooperative.  No apparent risk to self or others.

## 2023-06-24 LAB
CULTURE RESULTS: SIGNIFICANT CHANGE UP
SPECIMEN SOURCE: SIGNIFICANT CHANGE UP

## 2023-10-05 NOTE — H&P PST ADULT - NS PRO AD NO ADVANCE DIRECTIVE
All medications reviewed. Diabetic medications include Invokana 300mg daily, Lantus, Solostar 50 units and Mounjaro 5 mg weekly. The patient is incredibly happy. She can take MAG64 one tablet twice a day or she could take it once a day.
Current potassium 4.0 mEq/L, lowest 2.8 mEq/L 2 years ago. I think we have solved that problem and basically her potassium run in the low four range, taking potassium chloride 20 mEq once a day. I asked her to be sure to take it once daily and she can crush the medication or put it on an apple sauce or cottage cheese.
Her blood pressure is 134/54 mmHg  She is difficult to control. Good control on diltiazem 360 mg once daily and losartan/HCTZ 100mg/25mg.
Her weight is down 4 pounds since last visit, she is now 211 pounds.  Her BMI of 39.91.
Today A1c 6.5 percent down from 7.8 percent in 05/2023 and 8.5 percent in 01/2023--9 months ago. I explained about glycogenesis. We reviewed her continuous glucose monitor series 2 reader and most blood glucose are in the one 50 to 190 mg/dL range, occasionally over 200 mg/dL, and A1c has improved. She was encouraged to drink copious amounts of fluid. She follows in 2 months.
No

## 2023-12-06 NOTE — ED ADULT NURSE NOTE - BEFAST ARM SIDE DRIFT
I reviewed patient's note and patient's echocardiogram.  I agree there is no indication for any therapy at this point.  The mitral valve prolapse should be monitored over time but there is no indication for any intervention at this point.  Certainly patients with mitral valve prolapse tend to have more irregularity to their heartbeat and sometimes more awareness of their heartbeat but there is no clinical indication for any specific therapy at this point.    Silver Mendez MD       No

## 2024-05-10 NOTE — ED ADULT NURSE NOTE - NSICDXPASTSURGICALHX_GEN_ALL_CORE_FT
Emilia Dietrich is a 14 year old female who was brought in for this visit.  History was provided by the parent  HPI:     Chief Complaint   Patient presents with    Sore Throat   St x 3d no fever or cough    Current Outpatient Medications on File Prior to Visit   Medication Sig Dispense Refill    Norethin Ace-Eth Estrad-FE (JUNEL FE 1/20) 1-20 MG-MCG Oral Tab Take 1 tablet by mouth daily. 84 tablet 3     No current facility-administered medications on file prior to visit.       Allergies  No Known Allergies        PHYSICAL EXAM:   Temp 98 °F (36.7 °C) (Tympanic)   Wt 46.5 kg (102 lb 9.6 oz)   LMP 12/03/2023 (Approximate)     Constitutional: Well Hydrated in no distress  Eyes: no discharge noted  Ears: nl tms bilat  Nose/Throat: tonsils 1+ with erythema no exudate    Neck/Thyroid: Normal, small bilat nontender ant cervical lymphadenopathy  Respiratory: Normal  Cardiovascular: Normal  Abdomen: Normal no HSM  Skin:  No rash  Psychiatric: Normal        ASSESSMENT/PLAN:       ICD-10-CM    1. Sore throat  J02.9       Check t cx  Supportive care  F/u prn      Patient/parent questions answered and states understanding of instructions.  Call office if condition worsens or new symptoms, or if parent concerned.  Reviewed return precautions.    Results From Past 48 Hours:  No results found for this or any previous visit (from the past 48 hour(s)).    Orders Placed This Visit:  No orders of the defined types were placed in this encounter.      No follow-ups on file.      5/10/2024  Kamron Childers DO         PAST SURGICAL HISTORY:  S/P LASIK surgery of both eyes

## 2025-04-21 ENCOUNTER — NON-APPOINTMENT (OUTPATIENT)
Age: 45
End: 2025-04-21